# Patient Record
Sex: MALE | Race: WHITE | NOT HISPANIC OR LATINO | Employment: OTHER | RURAL
[De-identification: names, ages, dates, MRNs, and addresses within clinical notes are randomized per-mention and may not be internally consistent; named-entity substitution may affect disease eponyms.]

---

## 2020-08-12 ENCOUNTER — HISTORICAL (OUTPATIENT)
Dept: ADMINISTRATIVE | Facility: HOSPITAL | Age: 80
End: 2020-08-12

## 2020-08-13 LAB
APTT PPP: 44.5 SECONDS (ref 25.2–37.3)
INR BLD: 2.61 (ref 0–3.3)
PROTHROMBIN TIME: 26.4 SECONDS (ref 11.7–14.7)

## 2020-10-15 ENCOUNTER — HISTORICAL (OUTPATIENT)
Dept: ADMINISTRATIVE | Facility: HOSPITAL | Age: 80
End: 2020-10-15

## 2020-10-15 LAB
INR BLD: 2.66 (ref 0–3.3)
PROTHROMBIN TIME: 26.8 SECONDS (ref 11.7–14.7)

## 2021-05-24 RX ORDER — HYDROCHLOROTHIAZIDE 12.5 MG/1
12.5 TABLET ORAL DAILY
COMMUNITY
Start: 2021-04-22 | End: 2021-05-24 | Stop reason: SDUPTHER

## 2021-05-25 RX ORDER — HYDROCHLOROTHIAZIDE 12.5 MG/1
12.5 TABLET ORAL DAILY
Qty: 90 TABLET | Refills: 0 | Status: ON HOLD | OUTPATIENT
Start: 2021-05-25 | End: 2021-06-19 | Stop reason: HOSPADM

## 2021-06-01 ENCOUNTER — APPOINTMENT (OUTPATIENT)
Dept: LAB | Facility: CLINIC | Age: 81
End: 2021-06-01
Payer: MEDICARE

## 2021-06-01 DIAGNOSIS — I48.91 ATRIAL FIBRILLATION, UNSPECIFIED TYPE: Primary | ICD-10-CM

## 2021-06-01 LAB
CTP QC/QA: YES
INR POC: 2.3 (ref 0–3.3)
PT, POC: 28 (ref 12–14.7)

## 2021-06-01 PROCEDURE — 85610 PROTHROMBIN TIME: CPT | Mod: RHCUB

## 2021-06-17 ENCOUNTER — HOSPITAL ENCOUNTER (OUTPATIENT)
Facility: HOSPITAL | Age: 81
Discharge: HOME | End: 2021-06-19
Attending: EMERGENCY MEDICINE | Admitting: INTERNAL MEDICINE
Payer: MEDICARE

## 2021-06-17 DIAGNOSIS — I95.1 ORTHOSTATIC HYPOTENSION: Primary | ICD-10-CM

## 2021-06-17 DIAGNOSIS — R55 SYNCOPE: ICD-10-CM

## 2021-06-17 DIAGNOSIS — R53.1 GENERALIZED WEAKNESS: ICD-10-CM

## 2021-06-17 LAB
ALBUMIN SERPL BCP-MCNC: 4.1 G/DL (ref 3.5–5)
ALBUMIN/GLOB SERPL: 1.5 {RATIO}
ALP SERPL-CCNC: 74 U/L (ref 45–115)
ALT SERPL W P-5'-P-CCNC: 29 U/L (ref 16–61)
ANION GAP SERPL CALCULATED.3IONS-SCNC: 18 MMOL/L (ref 7–16)
ANISOCYTOSIS BLD QL SMEAR: ABNORMAL
APTT PPP: 31.3 SECONDS (ref 25.2–37.3)
AST SERPL W P-5'-P-CCNC: 21 U/L (ref 15–37)
BACTERIA #/AREA URNS HPF: ABNORMAL /HPF
BASOPHILS # BLD AUTO: 0.27 K/UL (ref 0–0.2)
BASOPHILS NFR BLD AUTO: 0.5 % (ref 0–1)
BASOPHILS NFR BLD MANUAL: 1 % (ref 0–1)
BILIRUB SERPL-MCNC: 0.4 MG/DL (ref 0–1.2)
BILIRUB UR QL STRIP: NEGATIVE
BUN SERPL-MCNC: 26 MG/DL (ref 7–18)
BUN/CREAT SERPL: 18 (ref 6–20)
CALCIUM SERPL-MCNC: 9.6 MG/DL (ref 8.5–10.1)
CHLORIDE SERPL-SCNC: 102 MMOL/L (ref 98–107)
CK MB SERPL-MCNC: <1 NG/ML (ref 1–3.6)
CK SERPL-CCNC: 77 U/L (ref 39–308)
CLARITY UR: CLEAR
CO2 SERPL-SCNC: 26 MMOL/L (ref 21–32)
COLOR UR: ABNORMAL
CREAT SERPL-MCNC: 1.44 MG/DL (ref 0.7–1.3)
DIFFERENTIAL METHOD BLD: ABNORMAL
EOSINOPHIL # BLD AUTO: 0.26 K/UL (ref 0–0.5)
EOSINOPHIL NFR BLD AUTO: 0.5 % (ref 1–4)
EOSINOPHIL NFR BLD MANUAL: 1 % (ref 1–4)
ERYTHROCYTE [DISTWIDTH] IN BLOOD BY AUTOMATED COUNT: 15 % (ref 11.5–14.5)
GLOBULIN SER-MCNC: 2.8 G/DL (ref 2–4)
GLUCOSE SERPL-MCNC: 132 MG/DL (ref 74–106)
GLUCOSE UR STRIP-MCNC: NEGATIVE MG/DL
HCT VFR BLD AUTO: 41.5 % (ref 40–54)
HGB BLD-MCNC: 13.3 G/DL (ref 13.5–18)
HYALINE CASTS #/AREA URNS LPF: ABNORMAL /LPF
IMM GRANULOCYTES # BLD AUTO: 0.24 K/UL (ref 0–0.04)
IMM GRANULOCYTES NFR BLD: 0.5 % (ref 0–0.4)
INR BLD: 1.96 (ref 0.9–1.1)
KETONES UR STRIP-SCNC: NEGATIVE MG/DL
LACTATE SERPL-SCNC: 1.5 MMOL/L (ref 0.4–2)
LEUKOCYTE ESTERASE UR QL STRIP: NEGATIVE
LYMPHOCYTES # BLD AUTO: 40.45 K/UL (ref 1–4.8)
LYMPHOCYTES NFR BLD AUTO: 76.8 % (ref 27–41)
LYMPHOCYTES NFR BLD MANUAL: 57 % (ref 27–41)
MAGNESIUM SERPL-MCNC: 1.9 MG/DL (ref 1.7–2.3)
MCH RBC QN AUTO: 29.2 PG (ref 27–31)
MCHC RBC AUTO-ENTMCNC: 32 G/DL (ref 32–36)
MCV RBC AUTO: 91 FL (ref 80–96)
MONOCYTES # BLD AUTO: 2.23 K/UL (ref 0–0.8)
MONOCYTES NFR BLD AUTO: 4.2 % (ref 2–6)
MONOCYTES NFR BLD MANUAL: 3 % (ref 2–6)
MPC BLD CALC-MCNC: 9.6 FL (ref 9.4–12.4)
NEUTROPHILS # BLD AUTO: 9.22 K/UL (ref 1.8–7.7)
NEUTROPHILS NFR BLD AUTO: 17.5 % (ref 53–65)
NEUTS SEG NFR BLD MANUAL: 38 % (ref 50–62)
NITRITE UR QL STRIP: NEGATIVE
NRBC # BLD AUTO: 0 X10E3/UL
NRBC, AUTO (.00): 0 %
NT-PROBNP SERPL-MCNC: 2624 PG/ML (ref 1–450)
OVALOCYTES BLD QL SMEAR: ABNORMAL
PH UR STRIP: 6.5 PH UNITS
PLATELET # BLD AUTO: 266 K/UL (ref 150–400)
PLATELET MORPHOLOGY: ABNORMAL
POLYCHROMASIA BLD QL SMEAR: ABNORMAL
POTASSIUM SERPL-SCNC: 4.3 MMOL/L (ref 3.5–5.1)
PROT SERPL-MCNC: 6.9 G/DL (ref 6.4–8.2)
PROT UR QL STRIP: NEGATIVE
PROTHROMBIN TIME: 21.2 SECONDS (ref 11.7–14.7)
RBC # BLD AUTO: 4.56 M/UL (ref 4.6–6.2)
RBC # UR STRIP: ABNORMAL /UL
RBC #/AREA URNS HPF: ABNORMAL /HPF
SMUDGE CELLS BLD QL SMEAR: ABNORMAL
SODIUM SERPL-SCNC: 142 MMOL/L (ref 136–145)
SP GR UR STRIP: 1.01
TROPONIN I SERPL-MCNC: 0.02 NG/ML
UROBILINOGEN UR STRIP-ACNC: 0.2 MG/DL
WBC # BLD AUTO: 52.67 K/UL (ref 4.5–11)
WBC #/AREA URNS HPF: ABNORMAL /HPF

## 2021-06-17 PROCEDURE — 85610 PROTHROMBIN TIME: CPT | Performed by: EMERGENCY MEDICINE

## 2021-06-17 PROCEDURE — 99283 PR EMERGENCY DEPT VISIT,LEVEL III: ICD-10-PCS | Mod: ,,, | Performed by: EMERGENCY MEDICINE

## 2021-06-17 PROCEDURE — 25000003 PHARM REV CODE 250: Performed by: EMERGENCY MEDICINE

## 2021-06-17 PROCEDURE — 96360 HYDRATION IV INFUSION INIT: CPT

## 2021-06-17 PROCEDURE — 96361 HYDRATE IV INFUSION ADD-ON: CPT

## 2021-06-17 PROCEDURE — 84484 ASSAY OF TROPONIN QUANT: CPT | Performed by: EMERGENCY MEDICINE

## 2021-06-17 PROCEDURE — 83605 ASSAY OF LACTIC ACID: CPT | Performed by: EMERGENCY MEDICINE

## 2021-06-17 PROCEDURE — 85730 THROMBOPLASTIN TIME PARTIAL: CPT | Performed by: EMERGENCY MEDICINE

## 2021-06-17 PROCEDURE — 25000003 PHARM REV CODE 250: Performed by: INTERNAL MEDICINE

## 2021-06-17 PROCEDURE — 83735 ASSAY OF MAGNESIUM: CPT | Performed by: EMERGENCY MEDICINE

## 2021-06-17 PROCEDURE — 85025 COMPLETE CBC W/AUTO DIFF WBC: CPT | Performed by: EMERGENCY MEDICINE

## 2021-06-17 PROCEDURE — 99219 PR INITIAL OBSERVATION CARE,LEVL II: ICD-10-PCS | Mod: ,,, | Performed by: INTERNAL MEDICINE

## 2021-06-17 PROCEDURE — 36415 COLL VENOUS BLD VENIPUNCTURE: CPT | Performed by: EMERGENCY MEDICINE

## 2021-06-17 PROCEDURE — 83880 ASSAY OF NATRIURETIC PEPTIDE: CPT | Performed by: EMERGENCY MEDICINE

## 2021-06-17 PROCEDURE — 93010 EKG 12-LEAD: ICD-10-PCS | Mod: ,,, | Performed by: INTERNAL MEDICINE

## 2021-06-17 PROCEDURE — 93005 ELECTROCARDIOGRAM TRACING: CPT

## 2021-06-17 PROCEDURE — 99219 PR INITIAL OBSERVATION CARE,LEVL II: CPT | Mod: ,,, | Performed by: INTERNAL MEDICINE

## 2021-06-17 PROCEDURE — 81001 URINALYSIS AUTO W/SCOPE: CPT | Performed by: EMERGENCY MEDICINE

## 2021-06-17 PROCEDURE — 93010 ELECTROCARDIOGRAM REPORT: CPT | Mod: ,,, | Performed by: INTERNAL MEDICINE

## 2021-06-17 PROCEDURE — 81003 URINALYSIS AUTO W/O SCOPE: CPT | Performed by: EMERGENCY MEDICINE

## 2021-06-17 PROCEDURE — G0378 HOSPITAL OBSERVATION PER HR: HCPCS

## 2021-06-17 PROCEDURE — 99285 EMERGENCY DEPT VISIT HI MDM: CPT | Mod: 25 | Performed by: EMERGENCY MEDICINE

## 2021-06-17 PROCEDURE — 96361 HYDRATE IV INFUSION ADD-ON: CPT | Performed by: EMERGENCY MEDICINE

## 2021-06-17 PROCEDURE — 82550 ASSAY OF CK (CPK): CPT | Performed by: EMERGENCY MEDICINE

## 2021-06-17 PROCEDURE — 99283 EMERGENCY DEPT VISIT LOW MDM: CPT | Mod: ,,, | Performed by: EMERGENCY MEDICINE

## 2021-06-17 PROCEDURE — 80053 COMPREHEN METABOLIC PANEL: CPT | Performed by: EMERGENCY MEDICINE

## 2021-06-17 RX ORDER — TALC
6 POWDER (GRAM) TOPICAL NIGHTLY PRN
Status: DISCONTINUED | OUTPATIENT
Start: 2021-06-17 | End: 2021-06-19 | Stop reason: HOSPADM

## 2021-06-17 RX ORDER — ACETAMINOPHEN 500 MG
1000 TABLET ORAL EVERY 8 HOURS PRN
Status: DISCONTINUED | OUTPATIENT
Start: 2021-06-17 | End: 2021-06-19 | Stop reason: HOSPADM

## 2021-06-17 RX ORDER — SODIUM CHLORIDE 9 MG/ML
INJECTION, SOLUTION INTRAVENOUS CONTINUOUS
Status: DISCONTINUED | OUTPATIENT
Start: 2021-06-17 | End: 2021-06-18

## 2021-06-17 RX ORDER — SODIUM,POTASSIUM PHOSPHATES 280-250MG
2 POWDER IN PACKET (EA) ORAL
Status: DISCONTINUED | OUTPATIENT
Start: 2021-06-17 | End: 2021-06-19 | Stop reason: HOSPADM

## 2021-06-17 RX ORDER — FINASTERIDE 5 MG/1
5 TABLET, FILM COATED ORAL NIGHTLY
COMMUNITY
Start: 2021-05-15 | End: 2024-03-05 | Stop reason: SDUPTHER

## 2021-06-17 RX ORDER — SODIUM CHLORIDE 0.9 % (FLUSH) 0.9 %
10 SYRINGE (ML) INJECTION
Status: DISCONTINUED | OUTPATIENT
Start: 2021-06-17 | End: 2021-06-19 | Stop reason: HOSPADM

## 2021-06-17 RX ORDER — METOPROLOL SUCCINATE 25 MG/1
25 TABLET, EXTENDED RELEASE ORAL DAILY
Status: DISCONTINUED | OUTPATIENT
Start: 2021-06-18 | End: 2021-06-19 | Stop reason: HOSPADM

## 2021-06-17 RX ORDER — LANOLIN ALCOHOL/MO/W.PET/CERES
800 CREAM (GRAM) TOPICAL
Status: DISCONTINUED | OUTPATIENT
Start: 2021-06-17 | End: 2021-06-19 | Stop reason: HOSPADM

## 2021-06-17 RX ORDER — WARFARIN SODIUM 5 MG/1
5 TABLET ORAL DAILY
COMMUNITY
Start: 2021-03-23 | End: 2021-06-25 | Stop reason: SDUPTHER

## 2021-06-17 RX ORDER — ATORVASTATIN CALCIUM 40 MG/1
40 TABLET, FILM COATED ORAL NIGHTLY
Status: DISCONTINUED | OUTPATIENT
Start: 2021-06-17 | End: 2021-06-19 | Stop reason: HOSPADM

## 2021-06-17 RX ORDER — CLOPIDOGREL BISULFATE 75 MG/1
75 TABLET ORAL DAILY
Status: DISCONTINUED | OUTPATIENT
Start: 2021-06-18 | End: 2021-06-19 | Stop reason: HOSPADM

## 2021-06-17 RX ORDER — METOPROLOL SUCCINATE 25 MG/1
25 TABLET, EXTENDED RELEASE ORAL 2 TIMES DAILY
COMMUNITY
Start: 2021-03-12

## 2021-06-17 RX ORDER — HYDROCHLOROTHIAZIDE 12.5 MG/1
12.5 TABLET ORAL DAILY
Status: DISCONTINUED | OUTPATIENT
Start: 2021-06-18 | End: 2021-06-18

## 2021-06-17 RX ORDER — ATORVASTATIN CALCIUM 40 MG/1
40 TABLET, FILM COATED ORAL NIGHTLY
COMMUNITY
Start: 2021-05-17 | End: 2022-02-25 | Stop reason: SDUPTHER

## 2021-06-17 RX ORDER — FINASTERIDE 5 MG/1
5 TABLET, FILM COATED ORAL DAILY
Status: DISCONTINUED | OUTPATIENT
Start: 2021-06-18 | End: 2021-06-19 | Stop reason: HOSPADM

## 2021-06-17 RX ORDER — WARFARIN SODIUM 5 MG/1
5 TABLET ORAL DAILY
Status: DISCONTINUED | OUTPATIENT
Start: 2021-06-18 | End: 2021-06-19 | Stop reason: HOSPADM

## 2021-06-17 RX ORDER — ONDANSETRON 2 MG/ML
4 INJECTION INTRAMUSCULAR; INTRAVENOUS EVERY 8 HOURS PRN
Status: DISCONTINUED | OUTPATIENT
Start: 2021-06-17 | End: 2021-06-19 | Stop reason: HOSPADM

## 2021-06-17 RX ORDER — IRBESARTAN 300 MG/1
150 TABLET ORAL DAILY
COMMUNITY
Start: 2021-03-12

## 2021-06-17 RX ORDER — GLUCAGON 1 MG
1 KIT INJECTION
Status: DISCONTINUED | OUTPATIENT
Start: 2021-06-17 | End: 2021-06-19 | Stop reason: HOSPADM

## 2021-06-17 RX ORDER — ACETAMINOPHEN 325 MG/1
650 TABLET ORAL EVERY 4 HOURS PRN
Status: DISCONTINUED | OUTPATIENT
Start: 2021-06-17 | End: 2021-06-19 | Stop reason: HOSPADM

## 2021-06-17 RX ORDER — PROCHLORPERAZINE EDISYLATE 5 MG/ML
5 INJECTION INTRAMUSCULAR; INTRAVENOUS EVERY 6 HOURS PRN
Status: DISCONTINUED | OUTPATIENT
Start: 2021-06-17 | End: 2021-06-19 | Stop reason: HOSPADM

## 2021-06-17 RX ORDER — POLYETHYLENE GLYCOL 3350 17 G/17G
17 POWDER, FOR SOLUTION ORAL DAILY
Status: DISCONTINUED | OUTPATIENT
Start: 2021-06-18 | End: 2021-06-19 | Stop reason: HOSPADM

## 2021-06-17 RX ORDER — CLOPIDOGREL BISULFATE 75 MG/1
75 TABLET ORAL DAILY
COMMUNITY
Start: 2021-04-21

## 2021-06-17 RX ADMIN — SODIUM CHLORIDE: 9 INJECTION, SOLUTION INTRAVENOUS at 07:06

## 2021-06-17 RX ADMIN — ATORVASTATIN CALCIUM 40 MG: 40 TABLET, FILM COATED ORAL at 09:06

## 2021-06-17 RX ADMIN — SODIUM CHLORIDE 1000 ML: 9 INJECTION, SOLUTION INTRAVENOUS at 04:06

## 2021-06-18 LAB
ANION GAP SERPL CALCULATED.3IONS-SCNC: 15 MMOL/L (ref 7–16)
ANISOCYTOSIS BLD QL SMEAR: ABNORMAL
AORTIC ROOT ANNULUS: 3.3 CM
AORTIC VALVE CUSP SEPERATION: 2.41 CM
AV INDEX (PROSTH): 1.15
AV MEAN GRADIENT: 2 MMHG
AV PEAK GRADIENT: 3 MMHG
AV REGURGITATION PRESSURE HALF TIME: 644 MS
AV VALVE AREA: 2.93 CM2
AV VELOCITY RATIO: 0.88
BASOPHILS # BLD AUTO: 0.2 K/UL (ref 0–0.2)
BASOPHILS NFR BLD AUTO: 0.4 % (ref 0–1)
BSA FOR ECHO PROCEDURE: 2.09 M2
BUN SERPL-MCNC: 23 MG/DL (ref 7–18)
BUN/CREAT SERPL: 20 (ref 6–20)
CALCIUM SERPL-MCNC: 8.7 MG/DL (ref 8.5–10.1)
CHLORIDE SERPL-SCNC: 107 MMOL/L (ref 98–107)
CO2 SERPL-SCNC: 26 MMOL/L (ref 21–32)
CREAT SERPL-MCNC: 1.16 MG/DL (ref 0.7–1.3)
CV ECHO LV RWT: 0.61 CM
DHEA SERPL-MCNC: NORMAL
DIFFERENTIAL METHOD BLD: ABNORMAL
DOP CALC AO PEAK VEL: 0.8 M/S
DOP CALC AO VTI: 13 CM
DOP CALC LVOT AREA: 2.5 CM2
DOP CALC LVOT DIAMETER: 1.8 CM
DOP CALC LVOT PEAK VEL: 0.7 M/S
DOP CALC LVOT STROKE VOLUME: 38.15 CM3
DOP CALCLVOT PEAK VEL VTI: 15 CM
E WAVE DECELERATION TIME: 146 MSEC
ECHO EF ESTIMATED: 60 %
ECHO LV POSTERIOR WALL: 1.24 CM (ref 0.6–1.1)
EJECTION FRACTION: 50 %
EOSINOPHIL # BLD AUTO: 0.24 K/UL (ref 0–0.5)
EOSINOPHIL NFR BLD AUTO: 0.5 % (ref 1–4)
EOSINOPHIL NFR BLD MANUAL: 2 % (ref 1–4)
ERYTHROCYTE [DISTWIDTH] IN BLOOD BY AUTOMATED COUNT: 15.3 % (ref 11.5–14.5)
ESTROGEN SERPL-MCNC: NORMAL PG/ML
FRACTIONAL SHORTENING: 29 % (ref 28–44)
GLUCOSE SERPL-MCNC: 127 MG/DL (ref 74–106)
HCT VFR BLD AUTO: 39 % (ref 40–54)
HGB BLD-MCNC: 12.6 G/DL (ref 13.5–18)
IMM GRANULOCYTES # BLD AUTO: 0.21 K/UL (ref 0–0.04)
IMM GRANULOCYTES NFR BLD: 0.4 % (ref 0–0.4)
INSULIN SERPL-ACNC: NORMAL U[IU]/ML
INTERVENTRICULAR SEPTUM: 1.38 CM (ref 0.6–1.1)
IVC OSTIUM: 1.7 CM
LAB AP CLINICAL INFORMATION: NORMAL
LEFT ATRIUM SIZE: 3.9 CM
LEFT INTERNAL DIMENSION IN SYSTOLE: 2.91 CM (ref 2.1–4)
LEFT VENTRICLE MASS INDEX: 94 G/M2
LEFT VENTRICULAR INTERNAL DIMENSION IN DIASTOLE: 4.07 CM (ref 3.5–6)
LEFT VENTRICULAR MASS: 193.62 G
LVOT MG: 1 MMHG
LYMPHOCYTES # BLD AUTO: 37.75 K/UL (ref 1–4.8)
LYMPHOCYTES NFR BLD AUTO: 76.6 % (ref 27–41)
LYMPHOCYTES NFR BLD MANUAL: 58 % (ref 27–41)
MAGNESIUM SERPL-MCNC: 1.8 MG/DL (ref 1.7–2.3)
MCH RBC QN AUTO: 29.3 PG (ref 27–31)
MCHC RBC AUTO-ENTMCNC: 32.3 G/DL (ref 32–36)
MCV RBC AUTO: 90.7 FL (ref 80–96)
MONOCYTES # BLD AUTO: 3.07 K/UL (ref 0–0.8)
MONOCYTES NFR BLD AUTO: 6.2 % (ref 2–6)
MONOCYTES NFR BLD MANUAL: 6 % (ref 2–6)
MPC BLD CALC-MCNC: 9.6 FL (ref 9.4–12.4)
MV PEAK E VEL: 0.85 M/S
NEUTROPHILS # BLD AUTO: 7.81 K/UL (ref 1.8–7.7)
NEUTROPHILS NFR BLD AUTO: 15.9 % (ref 53–65)
NEUTS SEG NFR BLD MANUAL: 34 % (ref 50–62)
NRBC # BLD AUTO: 0 X10E3/UL
NRBC, AUTO (.00): 0 %
OVALOCYTES BLD QL SMEAR: ABNORMAL
PHOSPHATE SERPL-MCNC: 3.2 MG/DL (ref 2.5–4.5)
PISA AR MAX VEL: 2.67 M/S
PISA TR MAX VEL: 3.2 M/S
PLATELET # BLD AUTO: 247 K/UL (ref 150–400)
PLATELET MORPHOLOGY: ABNORMAL
POLYCHROMASIA BLD QL SMEAR: ABNORMAL
POTASSIUM SERPL-SCNC: 4.1 MMOL/L (ref 3.5–5.1)
RA MAJOR: 3.7 CM
RA PRESSURE: 3 MMHG
RBC # BLD AUTO: 4.3 M/UL (ref 4.6–6.2)
RIGHT VENTRICULAR END-DIASTOLIC DIMENSION: 3.9 CM
SMUDGE CELLS BLD QL SMEAR: ABNORMAL
SODIUM SERPL-SCNC: 144 MMOL/L (ref 136–145)
T3RU NFR SERPL: NORMAL %
TR MAX PG: 41 MMHG
TRICUSPID ANNULAR PLANE SYSTOLIC EXCURSION: 2.2 CM
TV REST PULMONARY ARTERY PRESSURE: 44 MMHG
WBC # BLD AUTO: 49.28 K/UL (ref 4.5–11)

## 2021-06-18 PROCEDURE — 84100 ASSAY OF PHOSPHORUS: CPT | Performed by: INTERNAL MEDICINE

## 2021-06-18 PROCEDURE — 80048 BASIC METABOLIC PNL TOTAL CA: CPT | Performed by: INTERNAL MEDICINE

## 2021-06-18 PROCEDURE — 36415 COLL VENOUS BLD VENIPUNCTURE: CPT | Performed by: INTERNAL MEDICINE

## 2021-06-18 PROCEDURE — 99225 PR SUBSEQUENT OBSERVATION CARE,LEVEL II: ICD-10-PCS | Mod: ,,, | Performed by: INTERNAL MEDICINE

## 2021-06-18 PROCEDURE — 85025 COMPLETE CBC W/AUTO DIFF WBC: CPT | Performed by: INTERNAL MEDICINE

## 2021-06-18 PROCEDURE — 99225 PR SUBSEQUENT OBSERVATION CARE,LEVEL II: CPT | Mod: ,,, | Performed by: INTERNAL MEDICINE

## 2021-06-18 PROCEDURE — 25000003 PHARM REV CODE 250: Performed by: INTERNAL MEDICINE

## 2021-06-18 PROCEDURE — 87040 BLOOD CULTURE FOR BACTERIA: CPT | Performed by: INTERNAL MEDICINE

## 2021-06-18 PROCEDURE — G0378 HOSPITAL OBSERVATION PER HR: HCPCS

## 2021-06-18 PROCEDURE — 97165 OT EVAL LOW COMPLEX 30 MIN: CPT

## 2021-06-18 PROCEDURE — 96361 HYDRATE IV INFUSION ADD-ON: CPT | Performed by: EMERGENCY MEDICINE

## 2021-06-18 PROCEDURE — 83735 ASSAY OF MAGNESIUM: CPT | Performed by: INTERNAL MEDICINE

## 2021-06-18 PROCEDURE — 85060 PERIPHERAL SMEAR FOR PATHOLOGIST REVIEW: ICD-10-PCS | Mod: ,,, | Performed by: PATHOLOGY

## 2021-06-18 PROCEDURE — 25000003 PHARM REV CODE 250: Performed by: HOSPITALIST

## 2021-06-18 PROCEDURE — 85060 BLOOD SMEAR INTERPRETATION: CPT | Mod: ,,, | Performed by: PATHOLOGY

## 2021-06-18 PROCEDURE — 97161 PT EVAL LOW COMPLEX 20 MIN: CPT

## 2021-06-18 PROCEDURE — 96361 HYDRATE IV INFUSION ADD-ON: CPT

## 2021-06-18 RX ORDER — SODIUM CHLORIDE, SODIUM GLUCONATE, SODIUM ACETATE, POTASSIUM CHLORIDE AND MAGNESIUM CHLORIDE 30; 37; 368; 526; 502 MG/100ML; MG/100ML; MG/100ML; MG/100ML; MG/100ML
1000 INJECTION, SOLUTION INTRAVENOUS CONTINUOUS
Status: DISCONTINUED | OUTPATIENT
Start: 2021-06-18 | End: 2021-06-18

## 2021-06-18 RX ORDER — TAMSULOSIN HYDROCHLORIDE 0.4 MG/1
CAPSULE ORAL DAILY
COMMUNITY
End: 2023-11-02 | Stop reason: SDUPTHER

## 2021-06-18 RX ORDER — BISACODYL 5 MG
5 TABLET, DELAYED RELEASE (ENTERIC COATED) ORAL DAILY PRN
Status: DISCONTINUED | OUTPATIENT
Start: 2021-06-18 | End: 2021-06-19 | Stop reason: HOSPADM

## 2021-06-18 RX ORDER — AMLODIPINE BESYLATE 5 MG/1
5 TABLET ORAL DAILY
Status: DISCONTINUED | OUTPATIENT
Start: 2021-06-18 | End: 2021-06-19 | Stop reason: HOSPADM

## 2021-06-18 RX ORDER — POLYETHYLENE GLYCOL 3350 17 G/17G
17 POWDER, FOR SOLUTION ORAL ONCE
Status: COMPLETED | OUTPATIENT
Start: 2021-06-18 | End: 2021-06-18

## 2021-06-18 RX ADMIN — HYDROCHLOROTHIAZIDE 12.5 MG: 12.5 TABLET ORAL at 10:06

## 2021-06-18 RX ADMIN — CLOPIDOGREL 75 MG: 75 TABLET, FILM COATED ORAL at 09:06

## 2021-06-18 RX ADMIN — ATORVASTATIN CALCIUM 40 MG: 40 TABLET, FILM COATED ORAL at 08:06

## 2021-06-18 RX ADMIN — POLYETHYLENE GLYCOL 3350 17 G: 17 POWDER, FOR SOLUTION ORAL at 09:06

## 2021-06-18 RX ADMIN — SODIUM CHLORIDE, SODIUM GLUCONATE, SODIUM ACETATE, POTASSIUM CHLORIDE AND MAGNESIUM CHLORIDE 1000 ML: 526; 502; 368; 37; 30 INJECTION, SOLUTION INTRAVENOUS at 09:06

## 2021-06-18 RX ADMIN — POLYETHYLENE GLYCOL 3350 17 G: 17 POWDER, FOR SOLUTION ORAL at 10:06

## 2021-06-18 RX ADMIN — WARFARIN SODIUM 5 MG: 5 TABLET ORAL at 05:06

## 2021-06-18 RX ADMIN — METOPROLOL SUCCINATE 25 MG: 25 TABLET, EXTENDED RELEASE ORAL at 05:06

## 2021-06-18 RX ADMIN — FINASTERIDE 5 MG: 5 TABLET, FILM COATED ORAL at 08:06

## 2021-06-19 VITALS
BODY MASS INDEX: 27.92 KG/M2 | TEMPERATURE: 98 F | HEIGHT: 70 IN | WEIGHT: 195 LBS | SYSTOLIC BLOOD PRESSURE: 160 MMHG | OXYGEN SATURATION: 98 % | DIASTOLIC BLOOD PRESSURE: 79 MMHG | RESPIRATION RATE: 18 BRPM | HEART RATE: 60 BPM

## 2021-06-19 LAB
ANION GAP SERPL CALCULATED.3IONS-SCNC: 14 MMOL/L (ref 7–16)
ANISOCYTOSIS BLD QL SMEAR: ABNORMAL
ATYPICAL LYMPHOCYTES: ABNORMAL
BASOPHILS # BLD AUTO: 0.19 K/UL (ref 0–0.2)
BASOPHILS NFR BLD AUTO: 0.4 % (ref 0–1)
BUN SERPL-MCNC: 20 MG/DL (ref 7–18)
BUN/CREAT SERPL: 18 (ref 6–20)
CALCIUM SERPL-MCNC: 8.7 MG/DL (ref 8.5–10.1)
CHLORIDE SERPL-SCNC: 105 MMOL/L (ref 98–107)
CO2 SERPL-SCNC: 28 MMOL/L (ref 21–32)
CREAT SERPL-MCNC: 1.13 MG/DL (ref 0.7–1.3)
DIFFERENTIAL METHOD BLD: ABNORMAL
EOSINOPHIL # BLD AUTO: 0.39 K/UL (ref 0–0.5)
EOSINOPHIL NFR BLD AUTO: 0.9 % (ref 1–4)
EOSINOPHIL NFR BLD MANUAL: 2 % (ref 1–4)
ERYTHROCYTE [DISTWIDTH] IN BLOOD BY AUTOMATED COUNT: 15.2 % (ref 11.5–14.5)
GLUCOSE SERPL-MCNC: 127 MG/DL (ref 74–106)
HCT VFR BLD AUTO: 38.8 % (ref 40–54)
HGB BLD-MCNC: 12.1 G/DL (ref 13.5–18)
IMM GRANULOCYTES # BLD AUTO: 0.13 K/UL (ref 0–0.04)
IMM GRANULOCYTES NFR BLD: 0.3 % (ref 0–0.4)
LYMPHOCYTES # BLD AUTO: 36.46 K/UL (ref 1–4.8)
LYMPHOCYTES NFR BLD AUTO: 79.6 % (ref 27–41)
LYMPHOCYTES NFR BLD MANUAL: 76 % (ref 27–41)
MAGNESIUM SERPL-MCNC: 2 MG/DL (ref 1.7–2.3)
MCH RBC QN AUTO: 29.3 PG (ref 27–31)
MCHC RBC AUTO-ENTMCNC: 31.2 G/DL (ref 32–36)
MCV RBC AUTO: 93.9 FL (ref 80–96)
MONOCYTES # BLD AUTO: 2.53 K/UL (ref 0–0.8)
MONOCYTES NFR BLD AUTO: 5.5 % (ref 2–6)
MONOCYTES NFR BLD MANUAL: 2 % (ref 2–6)
MPC BLD CALC-MCNC: 9.9 FL (ref 9.4–12.4)
NEUTROPHILS # BLD AUTO: 6.11 K/UL (ref 1.8–7.7)
NEUTROPHILS NFR BLD AUTO: 13.3 % (ref 53–65)
NEUTS BAND NFR BLD MANUAL: 2 % (ref 1–5)
NEUTS SEG NFR BLD MANUAL: 18 % (ref 50–62)
NRBC # BLD AUTO: 0 X10E3/UL
NRBC, AUTO (.00): 0 %
OVALOCYTES BLD QL SMEAR: ABNORMAL
PHOSPHATE SERPL-MCNC: 2.8 MG/DL (ref 2.5–4.5)
PLATELET # BLD AUTO: 241 K/UL (ref 150–400)
POLYCHROMASIA BLD QL SMEAR: ABNORMAL
POTASSIUM SERPL-SCNC: 4.1 MMOL/L (ref 3.5–5.1)
RBC # BLD AUTO: 4.13 M/UL (ref 4.6–6.2)
SMUDGE CELLS BLD QL SMEAR: ABNORMAL
SODIUM SERPL-SCNC: 143 MMOL/L (ref 136–145)
WBC # BLD AUTO: 45.81 K/UL (ref 4.5–11)

## 2021-06-19 PROCEDURE — 99217 PR OBSERVATION CARE DISCHARGE: CPT | Mod: ,,, | Performed by: INTERNAL MEDICINE

## 2021-06-19 PROCEDURE — 85025 COMPLETE CBC W/AUTO DIFF WBC: CPT | Performed by: INTERNAL MEDICINE

## 2021-06-19 PROCEDURE — 83735 ASSAY OF MAGNESIUM: CPT | Performed by: INTERNAL MEDICINE

## 2021-06-19 PROCEDURE — 99217 PR OBSERVATION CARE DISCHARGE: ICD-10-PCS | Mod: ,,, | Performed by: INTERNAL MEDICINE

## 2021-06-19 PROCEDURE — 25000003 PHARM REV CODE 250: Performed by: INTERNAL MEDICINE

## 2021-06-19 PROCEDURE — 36415 COLL VENOUS BLD VENIPUNCTURE: CPT | Performed by: INTERNAL MEDICINE

## 2021-06-19 PROCEDURE — 80048 BASIC METABOLIC PNL TOTAL CA: CPT | Performed by: INTERNAL MEDICINE

## 2021-06-19 PROCEDURE — G0378 HOSPITAL OBSERVATION PER HR: HCPCS

## 2021-06-19 PROCEDURE — 84100 ASSAY OF PHOSPHORUS: CPT | Performed by: INTERNAL MEDICINE

## 2021-06-19 RX ORDER — TAMSULOSIN HYDROCHLORIDE 0.4 MG/1
0.4 CAPSULE ORAL DAILY
Status: DISCONTINUED | OUTPATIENT
Start: 2021-06-19 | End: 2021-06-19 | Stop reason: HOSPADM

## 2021-06-19 RX ORDER — HYDRALAZINE HYDROCHLORIDE 10 MG/1
10 TABLET, FILM COATED ORAL
Qty: 30 TABLET | Refills: 1 | Status: SHIPPED | OUTPATIENT
Start: 2021-06-19 | End: 2021-06-25 | Stop reason: SDUPTHER

## 2021-06-19 RX ADMIN — AMLODIPINE BESYLATE 5 MG: 5 TABLET ORAL at 09:06

## 2021-06-19 RX ADMIN — CLOPIDOGREL 75 MG: 75 TABLET, FILM COATED ORAL at 09:06

## 2021-06-19 RX ADMIN — TAMSULOSIN HYDROCHLORIDE 0.4 MG: 0.4 CAPSULE ORAL at 09:06

## 2021-06-19 RX ADMIN — POLYETHYLENE GLYCOL 3350 17 G: 17 POWDER, FOR SOLUTION ORAL at 09:06

## 2021-06-21 ENCOUNTER — TELEPHONE (OUTPATIENT)
Dept: FAMILY MEDICINE | Facility: CLINIC | Age: 81
End: 2021-06-21

## 2021-06-21 RX ORDER — ASCORBIC ACID 500 MG
1000 TABLET ORAL DAILY
COMMUNITY
Start: 2020-06-16 | End: 2021-10-18 | Stop reason: ALTCHOICE

## 2021-06-21 RX ORDER — PANTOPRAZOLE SODIUM 40 MG/1
1 TABLET, DELAYED RELEASE ORAL EVERY MORNING
COMMUNITY
Start: 2021-03-23 | End: 2021-06-28 | Stop reason: SDUPTHER

## 2021-06-24 LAB
BACTERIA BLD CULT: NORMAL
BACTERIA BLD CULT: NORMAL

## 2021-06-25 ENCOUNTER — OFFICE VISIT (OUTPATIENT)
Dept: FAMILY MEDICINE | Facility: CLINIC | Age: 81
End: 2021-06-25
Payer: MEDICARE

## 2021-06-25 VITALS
TEMPERATURE: 98 F | WEIGHT: 205.38 LBS | OXYGEN SATURATION: 98 % | SYSTOLIC BLOOD PRESSURE: 153 MMHG | BODY MASS INDEX: 29.4 KG/M2 | RESPIRATION RATE: 16 BRPM | DIASTOLIC BLOOD PRESSURE: 78 MMHG | HEART RATE: 70 BPM | HEIGHT: 70 IN

## 2021-06-25 DIAGNOSIS — R55 SYNCOPE AND COLLAPSE: ICD-10-CM

## 2021-06-25 DIAGNOSIS — I10 HYPERTENSION, UNSPECIFIED TYPE: Primary | ICD-10-CM

## 2021-06-25 DIAGNOSIS — E86.0 DEHYDRATION: ICD-10-CM

## 2021-06-25 PROBLEM — R53.1 GENERALIZED WEAKNESS: Chronic | Status: ACTIVE | Noted: 2021-06-17

## 2021-06-25 PROCEDURE — 99495 TCM SERVICES (MODERATE COMPLEXITY): ICD-10-PCS | Mod: ,,, | Performed by: FAMILY MEDICINE

## 2021-06-25 PROCEDURE — 99495 TRANSJ CARE MGMT MOD F2F 14D: CPT | Mod: ,,, | Performed by: FAMILY MEDICINE

## 2021-06-25 RX ORDER — HYDRALAZINE HYDROCHLORIDE 10 MG/1
10 TABLET, FILM COATED ORAL EVERY 8 HOURS
Qty: 90 TABLET | Refills: 2 | Status: SHIPPED | OUTPATIENT
Start: 2021-06-25 | End: 2021-09-29 | Stop reason: SDUPTHER

## 2021-06-25 RX ORDER — WARFARIN SODIUM 5 MG/1
5 TABLET ORAL DAILY
Qty: 30 TABLET | Refills: 2 | Status: SHIPPED | OUTPATIENT
Start: 2021-06-25 | End: 2021-09-29 | Stop reason: SDUPTHER

## 2021-06-28 DIAGNOSIS — K21.9 GASTROESOPHAGEAL REFLUX DISEASE, UNSPECIFIED WHETHER ESOPHAGITIS PRESENT: Primary | ICD-10-CM

## 2021-06-28 RX ORDER — PANTOPRAZOLE SODIUM 40 MG/1
40 TABLET, DELAYED RELEASE ORAL EVERY MORNING
Qty: 90 TABLET | Refills: 1 | Status: SHIPPED | OUTPATIENT
Start: 2021-06-28 | End: 2021-09-29 | Stop reason: SDUPTHER

## 2021-06-29 ENCOUNTER — OFFICE VISIT (OUTPATIENT)
Dept: SURGERY | Facility: CLINIC | Age: 81
End: 2021-06-29
Payer: MEDICARE

## 2021-06-29 VITALS — HEIGHT: 70 IN | HEART RATE: 76 BPM | BODY MASS INDEX: 27.92 KG/M2 | WEIGHT: 195 LBS | OXYGEN SATURATION: 98 %

## 2021-06-29 DIAGNOSIS — I65.23 BILATERAL CAROTID ARTERY STENOSIS: Primary | ICD-10-CM

## 2021-06-29 DIAGNOSIS — I65.29 OCCLUSION AND STENOSIS OF UNSPECIFIED CAROTID ARTERY: ICD-10-CM

## 2021-06-29 PROCEDURE — 99203 OFFICE O/P NEW LOW 30 MIN: CPT | Mod: S$PBB,,, | Performed by: SURGERY

## 2021-06-29 PROCEDURE — 99203 PR OFFICE/OUTPT VISIT, NEW, LEVL III, 30-44 MIN: ICD-10-PCS | Mod: S$PBB,,, | Performed by: SURGERY

## 2021-06-29 PROCEDURE — 99214 OFFICE O/P EST MOD 30 MIN: CPT | Mod: PBBFAC | Performed by: SURGERY

## 2021-07-09 ENCOUNTER — HOSPITAL ENCOUNTER (OUTPATIENT)
Dept: RADIOLOGY | Facility: HOSPITAL | Age: 81
Discharge: HOME | End: 2021-07-09
Attending: SURGERY
Payer: MEDICARE

## 2021-07-09 DIAGNOSIS — I65.29 OCCLUSION AND STENOSIS OF UNSPECIFIED CAROTID ARTERY: ICD-10-CM

## 2021-07-09 PROCEDURE — 70498 CTA NECK: ICD-10-PCS | Mod: 26,,, | Performed by: RADIOLOGY

## 2021-07-09 PROCEDURE — 70498 CT ANGIOGRAPHY NECK: CPT | Mod: TC

## 2021-07-09 PROCEDURE — 70498 CT ANGIOGRAPHY NECK: CPT | Mod: 26,,, | Performed by: RADIOLOGY

## 2021-07-09 PROCEDURE — 25500020 PHARM REV CODE 255: Performed by: SURGERY

## 2021-07-09 RX ADMIN — IOPAMIDOL 100 ML: 755 INJECTION, SOLUTION INTRAVENOUS at 09:07

## 2021-07-13 ENCOUNTER — OFFICE VISIT (OUTPATIENT)
Dept: SURGERY | Facility: CLINIC | Age: 81
End: 2021-07-13
Payer: MEDICARE

## 2021-07-13 VITALS — HEART RATE: 51 BPM | OXYGEN SATURATION: 97 % | BODY MASS INDEX: 27.92 KG/M2 | WEIGHT: 195 LBS | HEIGHT: 70 IN

## 2021-07-13 DIAGNOSIS — I65.23 BILATERAL CAROTID ARTERY STENOSIS: Primary | ICD-10-CM

## 2021-07-13 PROCEDURE — 99212 OFFICE O/P EST SF 10 MIN: CPT | Mod: S$PBB,ICN,, | Performed by: SURGERY

## 2021-07-13 PROCEDURE — 99212 PR OFFICE/OUTPT VISIT, EST, LEVL II, 10-19 MIN: ICD-10-PCS | Mod: S$PBB,ICN,, | Performed by: SURGERY

## 2021-07-13 PROCEDURE — 99214 OFFICE O/P EST MOD 30 MIN: CPT | Mod: PBBFAC | Performed by: SURGERY

## 2021-08-09 ENCOUNTER — APPOINTMENT (OUTPATIENT)
Dept: LAB | Facility: CLINIC | Age: 81
End: 2021-08-09
Payer: MEDICARE

## 2021-08-09 DIAGNOSIS — Z79.2 LONG TERM (CURRENT) USE OF ANTIBIOTICS: Primary | ICD-10-CM

## 2021-08-09 DIAGNOSIS — I48.91 ATRIAL FIBRILLATION, UNSPECIFIED TYPE: ICD-10-CM

## 2021-08-09 LAB
CTP QC/QA: YES
INR POC: 2 (ref 0–3.3)
PT, POC: 24 (ref 12–14.7)

## 2021-08-09 PROCEDURE — 85610 PROTHROMBIN TIME: CPT | Mod: RHCUB

## 2021-08-24 PROBLEM — G47.33 OSA ON CPAP: Status: ACTIVE | Noted: 2021-08-24

## 2021-08-25 ENCOUNTER — OFFICE VISIT (OUTPATIENT)
Dept: SLEEP MEDICINE | Facility: CLINIC | Age: 81
End: 2021-08-25
Payer: MEDICARE

## 2021-08-25 VITALS
HEART RATE: 111 BPM | OXYGEN SATURATION: 99 % | BODY MASS INDEX: 29.8 KG/M2 | SYSTOLIC BLOOD PRESSURE: 146 MMHG | WEIGHT: 208.19 LBS | DIASTOLIC BLOOD PRESSURE: 88 MMHG | HEIGHT: 70 IN

## 2021-08-25 DIAGNOSIS — I25.2 HISTORY OF MYOCARDIAL INFARCTION: ICD-10-CM

## 2021-08-25 DIAGNOSIS — I48.91 ATRIAL FIBRILLATION, UNSPECIFIED TYPE: ICD-10-CM

## 2021-08-25 DIAGNOSIS — G47.33 OSA ON CPAP: Primary | ICD-10-CM

## 2021-08-25 PROCEDURE — 99213 OFFICE O/P EST LOW 20 MIN: CPT | Mod: S$PBB,PN | Performed by: FAMILY MEDICINE

## 2021-08-25 PROCEDURE — 99999 PR STA SHADOW: CPT | Mod: PBBFAC,,, | Performed by: FAMILY MEDICINE

## 2021-08-25 PROCEDURE — 99999 PR STA SHADOW: ICD-10-PCS | Mod: PBBFAC,,, | Performed by: FAMILY MEDICINE

## 2021-08-25 PROCEDURE — 99213 OFFICE O/P EST LOW 20 MIN: CPT | Mod: PBBFAC,PN | Performed by: FAMILY MEDICINE

## 2021-09-29 ENCOUNTER — OFFICE VISIT (OUTPATIENT)
Dept: FAMILY MEDICINE | Facility: CLINIC | Age: 81
End: 2021-09-29
Payer: MEDICARE

## 2021-09-29 VITALS
HEIGHT: 70 IN | OXYGEN SATURATION: 93 % | DIASTOLIC BLOOD PRESSURE: 83 MMHG | BODY MASS INDEX: 29.49 KG/M2 | HEART RATE: 77 BPM | WEIGHT: 206 LBS | RESPIRATION RATE: 18 BRPM | TEMPERATURE: 97 F | SYSTOLIC BLOOD PRESSURE: 178 MMHG

## 2021-09-29 DIAGNOSIS — Z79.01 LONG TERM CURRENT USE OF ANTICOAGULANT: ICD-10-CM

## 2021-09-29 DIAGNOSIS — I10 ESSENTIAL HYPERTENSION: Chronic | ICD-10-CM

## 2021-09-29 DIAGNOSIS — K21.9 GASTROESOPHAGEAL REFLUX DISEASE WITHOUT ESOPHAGITIS: Chronic | ICD-10-CM

## 2021-09-29 DIAGNOSIS — E78.2 MIXED HYPERLIPIDEMIA: Chronic | ICD-10-CM

## 2021-09-29 DIAGNOSIS — G47.33 OSA ON CPAP: Chronic | ICD-10-CM

## 2021-09-29 DIAGNOSIS — I48.20 CHRONIC ATRIAL FIBRILLATION: Primary | Chronic | ICD-10-CM

## 2021-09-29 PROBLEM — I25.2 HISTORY OF MYOCARDIAL INFARCTION: Chronic | Status: ACTIVE | Noted: 2021-08-25

## 2021-09-29 PROBLEM — I48.91 ATRIAL FIBRILLATION: Chronic | Status: ACTIVE | Noted: 2021-08-25

## 2021-09-29 LAB
CTP QC/QA: YES
INR POC: 1.9 (ref 0–3.3)
PT, POC: 22.9 (ref 12–14.7)

## 2021-09-29 PROCEDURE — 99214 PR OFFICE/OUTPT VISIT, EST, LEVL IV, 30-39 MIN: ICD-10-PCS | Mod: ,,, | Performed by: FAMILY MEDICINE

## 2021-09-29 PROCEDURE — 85610 PROTHROMBIN TIME: CPT | Mod: RHCUB | Performed by: FAMILY MEDICINE

## 2021-09-29 PROCEDURE — 99214 OFFICE O/P EST MOD 30 MIN: CPT | Mod: ,,, | Performed by: FAMILY MEDICINE

## 2021-09-29 RX ORDER — IRBESARTAN 150 MG/1
150 TABLET ORAL NIGHTLY
COMMUNITY
End: 2021-09-29 | Stop reason: DRUGHIGH

## 2021-09-29 RX ORDER — AMLODIPINE BESYLATE 2.5 MG/1
2.5 TABLET ORAL DAILY
COMMUNITY
Start: 2021-04-16 | End: 2021-10-18

## 2021-09-29 RX ORDER — HYDROCHLOROTHIAZIDE 12.5 MG/1
12.5 CAPSULE ORAL DAILY
COMMUNITY
End: 2021-09-29 | Stop reason: SDUPTHER

## 2021-09-29 RX ORDER — AMIODARONE HYDROCHLORIDE 200 MG/1
200 TABLET ORAL
COMMUNITY
Start: 2020-06-16 | End: 2021-10-18

## 2021-09-29 RX ORDER — PANTOPRAZOLE SODIUM 40 MG/1
40 TABLET, DELAYED RELEASE ORAL EVERY MORNING
Qty: 90 TABLET | Refills: 1 | Status: SHIPPED | OUTPATIENT
Start: 2021-09-29 | End: 2022-03-31 | Stop reason: SDUPTHER

## 2021-09-29 RX ORDER — HYDROCODONE BITARTRATE AND ACETAMINOPHEN 5; 325 MG/1; MG/1
TABLET ORAL
COMMUNITY
Start: 2020-06-16 | End: 2021-10-18 | Stop reason: ALTCHOICE

## 2021-09-29 RX ORDER — ASPIRIN 81 MG/1
81 TABLET ORAL
COMMUNITY
Start: 2020-06-16

## 2021-09-29 RX ORDER — HYDROCHLOROTHIAZIDE 12.5 MG/1
12.5 CAPSULE ORAL DAILY
Qty: 90 CAPSULE | Refills: 1 | Status: SHIPPED | OUTPATIENT
Start: 2021-09-29 | End: 2021-11-17

## 2021-09-29 RX ORDER — WARFARIN SODIUM 5 MG/1
5 TABLET ORAL DAILY
Qty: 90 TABLET | Refills: 1 | Status: SHIPPED | OUTPATIENT
Start: 2021-09-29 | End: 2021-11-17

## 2021-09-29 RX ORDER — HYDRALAZINE HYDROCHLORIDE 10 MG/1
10 TABLET, FILM COATED ORAL EVERY 8 HOURS
Qty: 270 TABLET | Refills: 1 | Status: SHIPPED | OUTPATIENT
Start: 2021-09-29 | End: 2021-10-18 | Stop reason: DRUGHIGH

## 2021-10-18 ENCOUNTER — OFFICE VISIT (OUTPATIENT)
Dept: FAMILY MEDICINE | Facility: CLINIC | Age: 81
End: 2021-10-18
Payer: MEDICARE

## 2021-10-18 VITALS
HEART RATE: 93 BPM | RESPIRATION RATE: 18 BRPM | SYSTOLIC BLOOD PRESSURE: 171 MMHG | TEMPERATURE: 97 F | WEIGHT: 204 LBS | DIASTOLIC BLOOD PRESSURE: 100 MMHG | HEIGHT: 70 IN | BODY MASS INDEX: 29.2 KG/M2 | OXYGEN SATURATION: 97 %

## 2021-10-18 DIAGNOSIS — I48.20 CHRONIC ATRIAL FIBRILLATION: Primary | Chronic | ICD-10-CM

## 2021-10-18 DIAGNOSIS — Z79.01 LONG TERM CURRENT USE OF ANTICOAGULANT: ICD-10-CM

## 2021-10-18 DIAGNOSIS — I10 PRIMARY HYPERTENSION: Chronic | ICD-10-CM

## 2021-10-18 LAB
ANION GAP SERPL CALCULATED.3IONS-SCNC: 11 MMOL/L (ref 7–16)
ATYPICAL LYMPHOCYTES: ABNORMAL
BASOPHILS # BLD AUTO: 0.26 K/UL (ref 0–0.2)
BASOPHILS NFR BLD AUTO: 0.5 % (ref 0–1)
BUN SERPL-MCNC: 18 MG/DL (ref 7–18)
BUN/CREAT SERPL: 15 (ref 6–20)
CALCIUM SERPL-MCNC: 9.8 MG/DL (ref 8.5–10.1)
CHLORIDE SERPL-SCNC: 103 MMOL/L (ref 98–107)
CO2 SERPL-SCNC: 29 MMOL/L (ref 21–32)
CREAT SERPL-MCNC: 1.18 MG/DL (ref 0.7–1.3)
DIFFERENTIAL METHOD BLD: ABNORMAL
EOSINOPHIL # BLD AUTO: 0.34 K/UL (ref 0–0.5)
EOSINOPHIL NFR BLD AUTO: 0.6 % (ref 1–4)
EOSINOPHIL NFR BLD MANUAL: 1 % (ref 1–4)
ERYTHROCYTE [DISTWIDTH] IN BLOOD BY AUTOMATED COUNT: 14.8 % (ref 11.5–14.5)
GLUCOSE SERPL-MCNC: 106 MG/DL (ref 74–106)
HCT VFR BLD AUTO: 41.5 % (ref 40–54)
HGB BLD-MCNC: 13.6 G/DL (ref 13.5–18)
IMM GRANULOCYTES # BLD AUTO: 0.15 K/UL (ref 0–0.04)
IMM GRANULOCYTES NFR BLD: 0.3 % (ref 0–0.4)
LYMPHOCYTES # BLD AUTO: 45.4 K/UL (ref 1–4.8)
LYMPHOCYTES NFR BLD AUTO: 81.7 % (ref 27–41)
LYMPHOCYTES NFR BLD MANUAL: 80 % (ref 27–41)
MCH RBC QN AUTO: 28.6 PG (ref 27–31)
MCHC RBC AUTO-ENTMCNC: 32.8 G/DL (ref 32–36)
MCV RBC AUTO: 87.4 FL (ref 80–96)
MONOCYTES # BLD AUTO: 2.29 K/UL (ref 0–0.8)
MONOCYTES NFR BLD AUTO: 4.1 % (ref 2–6)
MONOCYTES NFR BLD MANUAL: 2 % (ref 2–6)
MPC BLD CALC-MCNC: 9.7 FL (ref 9.4–12.4)
NEUTROPHILS # BLD AUTO: 7.1 K/UL (ref 1.8–7.7)
NEUTROPHILS NFR BLD AUTO: 12.8 % (ref 53–65)
NEUTS SEG NFR BLD MANUAL: 17 % (ref 50–62)
NRBC # BLD AUTO: 0 X10E3/UL
NRBC, AUTO (.00): 0 %
PLATELET # BLD AUTO: 280 K/UL (ref 150–400)
PLATELET MORPHOLOGY: NORMAL
POTASSIUM SERPL-SCNC: 4 MMOL/L (ref 3.5–5.1)
RBC # BLD AUTO: 4.75 M/UL (ref 4.6–6.2)
RBC MORPH BLD: NORMAL
SMUDGE CELLS BLD QL SMEAR: ABNORMAL
SODIUM SERPL-SCNC: 139 MMOL/L (ref 136–145)
WBC # BLD AUTO: 55.54 K/UL (ref 4.5–11)

## 2021-10-18 PROCEDURE — 80048 BASIC METABOLIC PANEL: ICD-10-PCS | Mod: ,,, | Performed by: CLINICAL MEDICAL LABORATORY

## 2021-10-18 PROCEDURE — 99214 PR OFFICE/OUTPT VISIT, EST, LEVL IV, 30-39 MIN: ICD-10-PCS | Mod: ,,, | Performed by: FAMILY MEDICINE

## 2021-10-18 PROCEDURE — 85025 COMPLETE CBC W/AUTO DIFF WBC: CPT | Mod: ,,, | Performed by: CLINICAL MEDICAL LABORATORY

## 2021-10-18 PROCEDURE — 93010 PR ELECTROCARDIOGRAM REPORT: ICD-10-PCS | Mod: ,,, | Performed by: FAMILY MEDICINE

## 2021-10-18 PROCEDURE — 93010 ELECTROCARDIOGRAM REPORT: CPT | Mod: ,,, | Performed by: FAMILY MEDICINE

## 2021-10-18 PROCEDURE — 93005 ELECTROCARDIOGRAM TRACING: CPT | Mod: RHCUB | Performed by: FAMILY MEDICINE

## 2021-10-18 PROCEDURE — 99214 OFFICE O/P EST MOD 30 MIN: CPT | Mod: ,,, | Performed by: FAMILY MEDICINE

## 2021-10-18 PROCEDURE — 85025 CBC WITH DIFFERENTIAL: ICD-10-PCS | Mod: ,,, | Performed by: CLINICAL MEDICAL LABORATORY

## 2021-10-18 PROCEDURE — 80048 BASIC METABOLIC PNL TOTAL CA: CPT | Mod: ,,, | Performed by: CLINICAL MEDICAL LABORATORY

## 2021-10-18 RX ORDER — HYDRALAZINE HYDROCHLORIDE 25 MG/1
TABLET, FILM COATED ORAL
Qty: 90 TABLET | Refills: 2 | Status: SHIPPED | OUTPATIENT
Start: 2021-10-18 | End: 2021-11-17

## 2021-11-11 ENCOUNTER — TELEPHONE (OUTPATIENT)
Dept: FAMILY MEDICINE | Facility: CLINIC | Age: 81
End: 2021-11-11
Payer: MEDICARE

## 2021-11-11 RX ORDER — PREDNISONE 20 MG/1
20 TABLET ORAL DAILY
COMMUNITY
Start: 2021-10-25 | End: 2022-03-23

## 2021-11-11 RX ORDER — DOFETILIDE 0.25 MG/1
1 CAPSULE ORAL 2 TIMES DAILY
COMMUNITY
Start: 2021-11-10

## 2021-11-12 ENCOUNTER — OFFICE VISIT (OUTPATIENT)
Dept: FAMILY MEDICINE | Facility: CLINIC | Age: 81
End: 2021-11-12
Payer: MEDICARE

## 2021-11-12 VITALS
TEMPERATURE: 98 F | DIASTOLIC BLOOD PRESSURE: 80 MMHG | BODY MASS INDEX: 29.3 KG/M2 | HEIGHT: 70 IN | HEART RATE: 66 BPM | WEIGHT: 204.63 LBS | OXYGEN SATURATION: 96 % | SYSTOLIC BLOOD PRESSURE: 140 MMHG | RESPIRATION RATE: 16 BRPM

## 2021-11-12 DIAGNOSIS — I25.2 HISTORY OF MYOCARDIAL INFARCTION: Chronic | ICD-10-CM

## 2021-11-12 DIAGNOSIS — I48.91 ATRIAL FIBRILLATION, UNSPECIFIED TYPE: Chronic | ICD-10-CM

## 2021-11-12 DIAGNOSIS — I10 PRIMARY HYPERTENSION: Primary | Chronic | ICD-10-CM

## 2021-11-12 DIAGNOSIS — K21.9 GASTROESOPHAGEAL REFLUX DISEASE WITHOUT ESOPHAGITIS: Chronic | ICD-10-CM

## 2021-11-12 DIAGNOSIS — E78.2 MIXED HYPERLIPIDEMIA: Chronic | ICD-10-CM

## 2021-11-12 DIAGNOSIS — G47.33 OSA ON CPAP: Chronic | ICD-10-CM

## 2021-11-12 PROCEDURE — 99495 TCM SERVICES (MODERATE COMPLEXITY): ICD-10-PCS | Mod: ,,, | Performed by: FAMILY MEDICINE

## 2021-11-12 PROCEDURE — 99495 TRANSJ CARE MGMT MOD F2F 14D: CPT | Mod: ,,, | Performed by: FAMILY MEDICINE

## 2021-11-12 RX ORDER — DOFETILIDE 0.25 MG/1
250 CAPSULE ORAL
COMMUNITY
Start: 2021-11-10 | End: 2022-10-24 | Stop reason: SDUPTHER

## 2021-11-17 ENCOUNTER — OFFICE VISIT (OUTPATIENT)
Dept: SLEEP MEDICINE | Facility: CLINIC | Age: 81
End: 2021-11-17
Payer: MEDICARE

## 2021-11-17 VITALS
WEIGHT: 202.81 LBS | HEART RATE: 64 BPM | DIASTOLIC BLOOD PRESSURE: 72 MMHG | HEIGHT: 70 IN | SYSTOLIC BLOOD PRESSURE: 149 MMHG | OXYGEN SATURATION: 98 % | BODY MASS INDEX: 29.03 KG/M2

## 2021-11-17 DIAGNOSIS — G47.33 OSA ON CPAP: Primary | Chronic | ICD-10-CM

## 2021-11-17 DIAGNOSIS — I48.91 ATRIAL FIBRILLATION, UNSPECIFIED TYPE: Chronic | ICD-10-CM

## 2021-11-17 PROCEDURE — 99212 OFFICE O/P EST SF 10 MIN: CPT | Mod: S$PBB,,, | Performed by: FAMILY MEDICINE

## 2021-11-17 PROCEDURE — 99212 PR OFFICE/OUTPT VISIT, EST, LEVL II, 10-19 MIN: ICD-10-PCS | Mod: S$PBB,,, | Performed by: FAMILY MEDICINE

## 2021-11-17 PROCEDURE — 99213 OFFICE O/P EST LOW 20 MIN: CPT | Mod: PBBFAC,PN | Performed by: FAMILY MEDICINE

## 2021-11-17 RX ORDER — MULTIVITAMIN
1 TABLET ORAL DAILY
COMMUNITY

## 2022-01-05 LAB
LEFT EYE DM RETINOPATHY: NEGATIVE
RIGHT EYE DM RETINOPATHY: NEGATIVE

## 2022-02-25 DIAGNOSIS — E78.2 MIXED HYPERLIPIDEMIA: Primary | ICD-10-CM

## 2022-02-25 RX ORDER — ATORVASTATIN CALCIUM 40 MG/1
40 TABLET, FILM COATED ORAL NIGHTLY
Qty: 90 TABLET | Refills: 1 | Status: SHIPPED | OUTPATIENT
Start: 2022-02-25 | End: 2022-03-01 | Stop reason: SDUPTHER

## 2022-03-01 ENCOUNTER — OFFICE VISIT (OUTPATIENT)
Dept: FAMILY MEDICINE | Facility: CLINIC | Age: 82
End: 2022-03-01
Payer: MEDICARE

## 2022-03-01 VITALS
HEART RATE: 79 BPM | RESPIRATION RATE: 16 BRPM | SYSTOLIC BLOOD PRESSURE: 143 MMHG | OXYGEN SATURATION: 95 % | BODY MASS INDEX: 29.66 KG/M2 | TEMPERATURE: 98 F | WEIGHT: 207.19 LBS | HEIGHT: 70 IN | DIASTOLIC BLOOD PRESSURE: 76 MMHG

## 2022-03-01 DIAGNOSIS — E83.42 HYPOMAGNESEMIA: Chronic | ICD-10-CM

## 2022-03-01 DIAGNOSIS — G47.33 OSA ON CPAP: Chronic | ICD-10-CM

## 2022-03-01 DIAGNOSIS — K21.9 GASTROESOPHAGEAL REFLUX DISEASE WITHOUT ESOPHAGITIS: Chronic | ICD-10-CM

## 2022-03-01 DIAGNOSIS — I10 PRIMARY HYPERTENSION: Primary | Chronic | ICD-10-CM

## 2022-03-01 DIAGNOSIS — I48.19 PERSISTENT ATRIAL FIBRILLATION: Chronic | ICD-10-CM

## 2022-03-01 DIAGNOSIS — E78.2 MIXED HYPERLIPIDEMIA: Chronic | ICD-10-CM

## 2022-03-01 PROBLEM — I95.1 ORTHOSTATIC HYPOTENSION: Chronic | Status: ACTIVE | Noted: 2021-06-17

## 2022-03-01 LAB
ANION GAP SERPL CALCULATED.3IONS-SCNC: 12 MMOL/L (ref 7–16)
BILIRUB UR QL STRIP: NEGATIVE
BUN SERPL-MCNC: 14 MG/DL (ref 7–18)
BUN/CREAT SERPL: 13 (ref 6–20)
CALCIUM SERPL-MCNC: 8.8 MG/DL (ref 8.5–10.1)
CHLORIDE SERPL-SCNC: 101 MMOL/L (ref 98–107)
CHOLEST SERPL-MCNC: 113 MG/DL (ref 0–200)
CHOLEST/HDLC SERPL: 4.2 {RATIO}
CLARITY UR: CLEAR
CO2 SERPL-SCNC: 28 MMOL/L (ref 21–32)
COLOR UR: YELLOW
CREAT SERPL-MCNC: 1.09 MG/DL (ref 0.7–1.3)
GLUCOSE SERPL-MCNC: 103 MG/DL (ref 74–106)
GLUCOSE UR STRIP-MCNC: NEGATIVE MG/DL
HDLC SERPL-MCNC: 27 MG/DL (ref 40–60)
KETONES UR STRIP-SCNC: NEGATIVE MG/DL
LDLC SERPL CALC-MCNC: 10 MG/DL
LDLC/HDLC SERPL: 0.4 {RATIO}
LEUKOCYTE ESTERASE UR QL STRIP: NEGATIVE
MAGNESIUM SERPL-MCNC: 2.2 MG/DL (ref 1.7–2.3)
NITRITE UR QL STRIP: NEGATIVE
NONHDLC SERPL-MCNC: 86 MG/DL
PH UR STRIP: 6 PH UNITS
POTASSIUM SERPL-SCNC: 4.8 MMOL/L (ref 3.5–5.1)
PROT UR QL STRIP: NEGATIVE
RBC # UR STRIP: NEGATIVE /UL
SODIUM SERPL-SCNC: 136 MMOL/L (ref 136–145)
SP GR UR STRIP: 1.01
TRIGL SERPL-MCNC: 378 MG/DL (ref 35–150)
UROBILINOGEN UR STRIP-ACNC: 0.2 MG/DL
VLDLC SERPL-MCNC: 76 MG/DL

## 2022-03-01 PROCEDURE — 83735 ASSAY OF MAGNESIUM: CPT | Mod: ,,, | Performed by: CLINICAL MEDICAL LABORATORY

## 2022-03-01 PROCEDURE — 80061 LIPID PANEL: CPT | Mod: ,,, | Performed by: CLINICAL MEDICAL LABORATORY

## 2022-03-01 PROCEDURE — 82570 ASSAY OF URINE CREATININE: CPT | Mod: ,,, | Performed by: CLINICAL MEDICAL LABORATORY

## 2022-03-01 PROCEDURE — 82043 UR ALBUMIN QUANTITATIVE: CPT | Mod: ,,, | Performed by: CLINICAL MEDICAL LABORATORY

## 2022-03-01 PROCEDURE — 83735 MAGNESIUM: ICD-10-PCS | Mod: ,,, | Performed by: CLINICAL MEDICAL LABORATORY

## 2022-03-01 PROCEDURE — 81003 URINALYSIS, REFLEX TO URINE CULTURE: ICD-10-PCS | Mod: QW,,, | Performed by: CLINICAL MEDICAL LABORATORY

## 2022-03-01 PROCEDURE — 80048 BASIC METABOLIC PNL TOTAL CA: CPT | Mod: ,,, | Performed by: CLINICAL MEDICAL LABORATORY

## 2022-03-01 PROCEDURE — 82043 MICROALBUMIN / CREATININE RATIO URINE: ICD-10-PCS | Mod: ,,, | Performed by: CLINICAL MEDICAL LABORATORY

## 2022-03-01 PROCEDURE — 80048 BASIC METABOLIC PANEL: ICD-10-PCS | Mod: ,,, | Performed by: CLINICAL MEDICAL LABORATORY

## 2022-03-01 PROCEDURE — 80061 LIPID PANEL: ICD-10-PCS | Mod: ,,, | Performed by: CLINICAL MEDICAL LABORATORY

## 2022-03-01 PROCEDURE — 81003 URINALYSIS AUTO W/O SCOPE: CPT | Mod: QW,,, | Performed by: CLINICAL MEDICAL LABORATORY

## 2022-03-01 PROCEDURE — 82570 MICROALBUMIN / CREATININE RATIO URINE: ICD-10-PCS | Mod: ,,, | Performed by: CLINICAL MEDICAL LABORATORY

## 2022-03-01 PROCEDURE — 99214 PR OFFICE/OUTPT VISIT, EST, LEVL IV, 30-39 MIN: ICD-10-PCS | Mod: ,,, | Performed by: FAMILY MEDICINE

## 2022-03-01 PROCEDURE — 99214 OFFICE O/P EST MOD 30 MIN: CPT | Mod: ,,, | Performed by: FAMILY MEDICINE

## 2022-03-01 RX ORDER — ATORVASTATIN CALCIUM 40 MG/1
40 TABLET, FILM COATED ORAL NIGHTLY
Qty: 90 TABLET | Refills: 1 | Status: SHIPPED | OUTPATIENT
Start: 2022-03-01 | End: 2022-08-30 | Stop reason: SDUPTHER

## 2022-03-01 NOTE — PROGRESS NOTES
John Victor MD    [address] [place] [address]     PATIENT NAME: Vaibhav Tong  : 1940  DATE: 3/1/22  MRN: [id number]      Billing Provider: John Victor MD  Level of Service: [place] OFFICE/OUTPT VISIT, EST, LEVL IV, 30-39 MIN  Patient PCP Information     Provider PCP Type    John Victor MD General          Reason for Visit / Chief Complaint: Follow-up (Medication refills)       Update PCP  Update Chief Complaint         History of Present Illness / Problem Focused Workflow     Vaibhav Tong presents to the clinic with Follow-up (Medication refills)     Dr. Garg is his Cardiologist    Dr. Metcalf is his Electrophysiologist       HPI    Review of Systems     Review of Systems   Constitutional: Negative for activity change, appetite change, chills, fatigue and fever.   HENT: Negative for nasal congestion, ear pain, hearing loss, postnasal drip and sore throat.    Respiratory: Negative for cough, chest tightness, shortness of breath and wheezing.    Cardiovascular: Negative for chest pain, palpitations, leg swelling and claudication.   Gastrointestinal: Negative for abdominal pain, change in bowel habit, constipation, diarrhea, nausea, vomiting and change in bowel habit.   Genitourinary: Negative for dysuria.   Musculoskeletal: Negative for arthralgias, back pain, gait problem and myalgias.   Integumentary:  Negative for rash.   Neurological: Negative for weakness and headaches.   Psychiatric/Behavioral: Negative for suicidal ideas. The patient is not nervous/anxious.         Medical / Social / Family History     Past Medical History:   Diagnosis Date    Atrial fibrillation     Hyperlipidemia     Hypertension        Past Surgical History:   Procedure Laterality Date    abaltion      APPENDECTOMY         Social History    reports that he has never smoked. He has never used smokeless tobacco. He reports that he does not drink alcohol and  does not use drugs.    Family History  's family history includes Heart disease in his brother; No Known Problems in his father and mother.    Medications and Allergies     Medications  Outpatient Medications Marked as Taking for the 3/1/22 encounter (Office Visit) with John Victor MD   Medication Sig Dispense Refill    aspirin (ECOTRIN) 81 MG EC tablet Take 81 mg by mouth.      clopidogreL (PLAVIX) 75 mg tablet Take 75 mg by mouth once daily.      dofetilide (TIKOSYN) 250 MCG Cap Take 1 capsule by mouth 2 (two) times a day.      dofetilide (TIKOSYN) 250 MCG Cap Take 250 mcg by mouth.      finasteride (PROSCAR) 5 mg tablet Take 5 mg by mouth nightly.       irbesartan (AVAPRO) 300 MG tablet Take 150 mg by mouth once daily.      metoprolol succinate (TOPROL-XL) 25 MG 24 hr tablet Take 25 mg by mouth 2 (two) times daily.      multivitamin (THERAGRAN) per tablet Take 1 tablet by mouth once daily.      pantoprazole (PROTONIX) 40 MG tablet Take 1 tablet (40 mg total) by mouth every morning. For REFLUX 90 tablet 1    tamsulosin (FLOMAX) 0.4 mg Cap Take by mouth once daily.      [DISCONTINUED] atorvastatin (LIPITOR) 40 MG tablet Take 1 tablet (40 mg total) by mouth nightly. 90 tablet 1       Allergies  Review of patient's allergies indicates:   Allergen Reactions    Ace inhibitors     Codeine        Physical Examination     Vitals:    03/01/22 1003   BP: (!) 143/76   Pulse:    Resp:    Temp:      Physical Exam  Vitals and nursing note reviewed.   Constitutional:       General: He is not in acute distress.     Appearance: Normal appearance. He is not ill-appearing.   Eyes:      Extraocular Movements: Extraocular movements intact.      Pupils: Pupils are equal, round, and reactive to light.   Cardiovascular:      Rate and Rhythm: Normal rate and regular rhythm.      Heart sounds: Normal heart sounds.   Pulmonary:      Effort: Pulmonary effort is normal.      Breath sounds: Normal breath sounds.    Abdominal:      General: Bowel sounds are normal.      Palpations: Abdomen is soft.   Musculoskeletal:         General: Normal range of motion.   Skin:     Findings: No rash.   Neurological:      General: No focal deficit present.      Mental Status: He is alert and oriented to person, place, and time. Mental status is at baseline.   Psychiatric:         Mood and Affect: Mood normal.         Behavior: Behavior normal.          Assessment and Plan (including Health Maintenance)      Problem List  Smart Sets  Document Outside HM   :    Plan:         Health Maintenance Due   Topic Date Due    Lipid Panel  Never done       Problem List Items Addressed This Visit        Cardiac/Vascular    Hypertension - Primary (Chronic)    Relevant Orders    Basic Metabolic Panel    Lipid Panel    Microalbumin/Creatinine Ratio, Urine    Urinalysis, Reflex to Urine Culture Urine, Clean Catch    Atrial fibrillation (Chronic)    Hyperlipidemia (Chronic)    Relevant Medications    atorvastatin (LIPITOR) 40 MG tablet    Other Relevant Orders    Lipid Panel       GI    Gastroesophageal reflux disease without esophagitis (Chronic)       Other    ARDEN on CPAP (Chronic)      Other Visit Diagnoses     Hypomagnesemia  (Chronic)       Relevant Orders    Magnesium        Primary hypertension  -     Basic Metabolic Panel; Future; Expected date: 03/01/2022  -     Lipid Panel; Future; Expected date: 03/01/2022  -     Microalbumin/Creatinine Ratio, Urine; Future; Expected date: 03/01/2022  -     Urinalysis, Reflex to Urine Culture Urine, Clean Catch; Future; Expected date: 03/01/2022    Mixed hyperlipidemia  -     atorvastatin (LIPITOR) 40 MG tablet; Take 1 tablet (40 mg total) by mouth nightly.  Dispense: 90 tablet; Refill: 1  -     Lipid Panel; Future; Expected date: 03/01/2022    Persistent atrial fibrillation    Hypomagnesemia  -     Magnesium; Future; Expected date: 03/01/2022    Gastroesophageal reflux disease without esophagitis    ARDEN on  CPAP       The patient has no Health Maintenance topics of status Not Due    Procedures     Future Appointments   Date Time Provider Department Center   5/2/2022 11:00 AM AWV NURSE, [facility] FAMILY MEDICINE [facility] FAM[facility] [place]   5/4/2022 11:15 AM Tera Jones DO [facility] SLEEP [place]   7/13/2022 10:30 [facility] [address] [place] [address] VASCUS [facility]    7/13/2022 11:00 AM [facility]        Follow up in about 6 months (around 9/1/2022), or if symptoms worsen or fail to improve, for chronic health problems.     Signature:  John Victor MD  [address] [place] [address]  Phone #: [phone]  Fax #: [phone]    Date of encounter: 3/1/22    There are no Patient Instructions on file for this visit.

## 2022-03-02 LAB
CREAT UR-MCNC: 55 MG/DL (ref 39–259)
MICROALBUMIN UR-MCNC: 0.6 MG/DL (ref 0–2.8)
MICROALBUMIN/CREAT RATIO PNL UR: 10.9 MG/G (ref 0–30)

## 2022-03-11 DIAGNOSIS — Z71.89 COMPLEX CARE COORDINATION: ICD-10-CM

## 2022-03-23 ENCOUNTER — OFFICE VISIT (OUTPATIENT)
Dept: SLEEP MEDICINE | Facility: CLINIC | Age: 82
End: 2022-03-23
Attending: FAMILY MEDICINE
Payer: MEDICARE

## 2022-03-23 VITALS
DIASTOLIC BLOOD PRESSURE: 76 MMHG | HEIGHT: 70 IN | SYSTOLIC BLOOD PRESSURE: 160 MMHG | OXYGEN SATURATION: 99 % | HEART RATE: 60 BPM | WEIGHT: 210 LBS | BODY MASS INDEX: 30.06 KG/M2

## 2022-03-23 DIAGNOSIS — I48.91 ATRIAL FIBRILLATION, UNSPECIFIED TYPE: Chronic | ICD-10-CM

## 2022-03-23 DIAGNOSIS — G47.33 OSA ON CPAP: Primary | Chronic | ICD-10-CM

## 2022-03-23 PROCEDURE — 99212 PR OFFICE/OUTPT VISIT, EST, LEVL II, 10-19 MIN: ICD-10-PCS | Mod: S$PBB,,, | Performed by: FAMILY MEDICINE

## 2022-03-23 PROCEDURE — 99212 OFFICE O/P EST SF 10 MIN: CPT | Mod: S$PBB,,, | Performed by: FAMILY MEDICINE

## 2022-03-23 PROCEDURE — 99214 OFFICE O/P EST MOD 30 MIN: CPT | Mod: PBBFAC,PN | Performed by: FAMILY MEDICINE

## 2022-03-23 NOTE — PROGRESS NOTES
Subjective:       Patient ID: Vaibhav Tong is a 82 y.o. male.    Chief Complaint: Sleep Apnea (CPAP management)    Patient follows up in sleep clinic for CPAP management and has received a new CPAP.  Compliance is 100% with an average AHI of 0.4 at a pressure of 10 cm H2O.  [place] score is 7 and the patient is using and benefitting from CPAP.  Patient's initial AHI was 22.1 with a low O2 saturation of 75%.  The patient uses a fullface mask.    Review of Systems   Constitutional: Negative for fatigue.        Negative EDS   Respiratory: Negative for apnea.    Psychiatric/Behavioral: Negative for sleep disturbance.         Objective:      Physical Exam  Cardiovascular:      Rate and Rhythm: Normal rate and regular rhythm.      Heart sounds: No murmur heard.  Pulmonary:      Breath sounds: Normal breath sounds.   Skin:     General: Skin is warm and dry.   Neurological:      Mental Status: He is alert and oriented to person, place, and time.         Assessment:       Problem List Items Addressed This Visit        Cardiac/Vascular    Atrial fibrillation (Chronic)       Other    ARDEN on CPAP - Primary (Chronic)          Plan:       1. Continue CPAP @ 10 cm H20  2. Caution while operating a motor vehicle  3. Prescription for new supplies  4. Follow up sleep clinic in 1 year.

## 2022-03-23 NOTE — PROCEDURES
Procedures  Patient presents to clinic for CPAP management.  Patient received new replacement machine.  No problems with machine or mask.  ESS is 7.  Patient is 100% compliant with and AHI of 0.4.  Patient wears FFM with non-heated tubing.  [facility] [place]

## 2022-03-31 DIAGNOSIS — K21.9 GASTROESOPHAGEAL REFLUX DISEASE WITHOUT ESOPHAGITIS: Chronic | ICD-10-CM

## 2022-03-31 RX ORDER — PANTOPRAZOLE SODIUM 40 MG/1
40 TABLET, DELAYED RELEASE ORAL EVERY MORNING
Qty: 90 TABLET | Refills: 1 | Status: SHIPPED | OUTPATIENT
Start: 2022-03-31 | End: 2022-09-29 | Stop reason: SDUPTHER

## 2022-03-31 NOTE — TELEPHONE ENCOUNTER
----- Message from Jina Enriquez sent at 3/30/2022 10:03 AM CDT -----  Please call in for Pts pantoprazole (PROTONIX) 40 MG to [facility]

## 2022-04-14 ENCOUNTER — TELEPHONE (OUTPATIENT)
Dept: FAMILY MEDICINE | Facility: CLINIC | Age: 82
End: 2022-04-14
Payer: MEDICARE

## 2022-04-14 NOTE — TELEPHONE ENCOUNTER
----- Message from Eileen Collado sent at 4/14/2022  8:54 AM CDT -----   [facility] recommends a new prescription be written for Hydralazine 25MG. Prescription benefit covers a 90 day supply versus a 30 day supply; sent to [facility].

## 2022-04-18 ENCOUNTER — TELEPHONE (OUTPATIENT)
Dept: FAMILY MEDICINE | Facility: CLINIC | Age: 82
End: 2022-04-18
Payer: MEDICARE

## 2022-04-18 NOTE — TELEPHONE ENCOUNTER
----- Message from Eileen Plunkett sent at 4/18/2022 11:13 AM CDT -----  Patient would like a refill of hydralazine 25 mg sent to [facility]. I was unable to see it in his chart, but thought you would be able to go farther back. Good call back number is [phone].

## 2022-04-19 DIAGNOSIS — I10 PRIMARY HYPERTENSION: Primary | ICD-10-CM

## 2022-04-19 RX ORDER — HYDRALAZINE HYDROCHLORIDE 10 MG/1
TABLET, FILM COATED ORAL
Qty: 270 TABLET | Refills: 1 | Status: SHIPPED | OUTPATIENT
Start: 2022-04-19 | End: 2022-04-21 | Stop reason: CLARIF

## 2022-04-19 NOTE — TELEPHONE ENCOUNTER
Patient called requesting refill on medication. It had been discontinued on his medication list for some reason.

## 2022-04-21 ENCOUNTER — TELEPHONE (OUTPATIENT)
Dept: FAMILY MEDICINE | Facility: CLINIC | Age: 82
End: 2022-04-21
Payer: MEDICARE

## 2022-04-21 RX ORDER — HYDRALAZINE HYDROCHLORIDE 25 MG/1
25 TABLET, FILM COATED ORAL 3 TIMES DAILY
COMMUNITY
Start: 2022-03-24 | End: 2022-06-20 | Stop reason: DRUGHIGH

## 2022-04-21 NOTE — TELEPHONE ENCOUNTER
[facility] called and states that patient transferred medications over and they had two different Hydralazine prescriptions. Patient has previously been taking 25 mg TID and we sent in 10 mg TID. Patient is supposed to be taking the 25 mg, we made an error. Updating that in his chart now.

## 2022-04-30 ENCOUNTER — EXTERNAL CHRONIC CARE MANAGEMENT (OUTPATIENT)
Dept: FAMILY MEDICINE | Facility: CLINIC | Age: 82
End: 2022-04-30
Payer: MEDICARE

## 2022-04-30 PROCEDURE — G0511 CCM/BHI BY RHC/FQHC 20MIN MO: HCPCS | Mod: ,,, | Performed by: FAMILY MEDICINE

## 2022-04-30 PROCEDURE — G0511 PR CHRONIC CARE MGMT, RHC OR FQHC ONLY, 20 MINS OR MORE: ICD-10-PCS | Mod: ,,, | Performed by: FAMILY MEDICINE

## 2022-05-02 ENCOUNTER — OFFICE VISIT (OUTPATIENT)
Dept: FAMILY MEDICINE | Facility: CLINIC | Age: 82
End: 2022-05-02
Payer: MEDICARE

## 2022-05-02 VITALS
RESPIRATION RATE: 18 BRPM | DIASTOLIC BLOOD PRESSURE: 84 MMHG | BODY MASS INDEX: 29.83 KG/M2 | SYSTOLIC BLOOD PRESSURE: 160 MMHG | OXYGEN SATURATION: 96 % | WEIGHT: 208.38 LBS | HEIGHT: 70 IN | HEART RATE: 64 BPM | TEMPERATURE: 98 F

## 2022-05-02 DIAGNOSIS — E78.2 MIXED HYPERLIPIDEMIA: Chronic | ICD-10-CM

## 2022-05-02 DIAGNOSIS — I10 PRIMARY HYPERTENSION: Primary | Chronic | ICD-10-CM

## 2022-05-02 DIAGNOSIS — I48.91 ATRIAL FIBRILLATION, UNSPECIFIED TYPE: Chronic | ICD-10-CM

## 2022-05-02 DIAGNOSIS — K21.9 GASTROESOPHAGEAL REFLUX DISEASE WITHOUT ESOPHAGITIS: Chronic | ICD-10-CM

## 2022-05-02 DIAGNOSIS — G47.33 OSA ON CPAP: Chronic | ICD-10-CM

## 2022-05-02 PROCEDURE — G0439 PR MEDICARE ANNUAL WELLNESS SUBSEQUENT VISIT: ICD-10-PCS | Mod: ,,, | Performed by: FAMILY MEDICINE

## 2022-05-02 PROCEDURE — G0439 PPPS, SUBSEQ VISIT: HCPCS | Mod: ,,, | Performed by: FAMILY MEDICINE

## 2022-05-02 NOTE — PROGRESS NOTES
[facility]   [facility] FAMILY MEDICINE      PATIENT NAME: Vaibhav Tong   : 1940    AGE: 82 y.o. DATE: 2022   MRN: [id number]        Reason for Visit / Chief Complaint: Medicare AWV Follow Up (Medicare subsequent AWV )        Vaibhav Tong presents for an subsequent Medicare AWV today.     The following components were reviewed and updated:    Medical/Social/Family History:  Past Medical History:   Diagnosis Date    Atrial fibrillation     Hyperlipidemia     Hypertension         Family History   Problem Relation Age of Onset    Hypertension Mother     No Known Problems Father     Heart disease Brother     Heart disease Sister     Hypertension Sister         Social History     Tobacco Use   Smoking Status Never Smoker   Smokeless Tobacco Never Used      Social History     Substance and Sexual Activity   Alcohol Use Never       Family History   Problem Relation Age of Onset    Hypertension Mother     No Known Problems Father     Heart disease Brother     Heart disease Sister     Hypertension Sister        Past Surgical History:   Procedure Laterality Date    abaltion      APPENDECTOMY           · Allergies and Current Medications     Review of patient's allergies indicates:   Allergen Reactions    Ace inhibitors     Codeine        Current Outpatient Medications:     aspirin (ECOTRIN) 81 MG EC tablet, Take 81 mg by mouth., Disp: , Rfl:     atorvastatin (LIPITOR) 40 MG tablet, Take 1 tablet (40 mg total) by mouth nightly., Disp: 90 tablet, Rfl: 1    clopidogreL (PLAVIX) 75 mg tablet, Take 75 mg by mouth once daily., Disp: , Rfl:     dofetilide (TIKOSYN) 250 MCG Cap, Take 1 capsule by mouth 2 (two) times a day., Disp: , Rfl:     finasteride (PROSCAR) 5 mg tablet, Take 5 mg by mouth nightly. , Disp: , Rfl:     hydrALAZINE (APRESOLINE) 25 MG tablet, Take 25 mg by mouth 3 (three) times daily., Disp: , Rfl:     irbesartan (AVAPRO) 300 MG tablet, Take 150 mg by mouth once daily., Disp:  , Rfl:     metoprolol succinate (TOPROL-XL) 25 MG 24 hr tablet, Take 25 mg by mouth 2 (two) times daily., Disp: , Rfl:     multivitamin (THERAGRAN) per tablet, Take 1 tablet by mouth once daily., Disp: , Rfl:     pantoprazole (PROTONIX) 40 MG tablet, Take 1 tablet (40 mg total) by mouth every morning. For REFLUX, Disp: 90 tablet, Rfl: 1    tamsulosin (FLOMAX) 0.4 mg Cap, Take by mouth once daily., Disp: , Rfl:     dofetilide (TIKOSYN) 250 MCG Cap, Take 250 mcg by mouth., Disp: , Rfl:     · Health Risk Assessment   Fall Risk: NO    Advance Directive:  Does not have an advanced directive. Verbal education and written education included in today's AVS.   Depression: [id number]    HTN:  DASH diet, exercise, weight management, med compliance, home BP monitoring, and follow-up discussed.   T2DM: NO   Tobacco use: no  STI: N/A    Alcohol misuse: No   Statin Use: yes      · Health Risk Assessment  What is your age?: 80 or older  Are you male or female?: Male  During the past four weeks, how much have you been bothered by emotional problems such as feeling anxious, depressed, irritable, sad, or downhearted and blue?: Slightly  During the past five weeks, has your physical and/or emotional health limited your social activities with family, friends, neighbors, or groups?: Slightly  During the past four weeks, how much bodily pain have you generally had?: No pain  During the past four weeks, was someone available to help if you needed and wanted help?: Yes, as much as I wanted  During the past four weeks, what was the hardest physical activity you could do for at least two minutes?: Moderate  Can you get to places out of walking distance without help?  (For example, can you travel alone on buses or taxis, or drive your own car?): Yes  Can you go shopping for groceries or clothes without someone's help?: Yes  Can you prepare your own meals?: Yes  Can you do your own housework without help?: Yes  Because of any health problems,  do you need the help of another person with your personal care needs such as eating, bathing, dressing, or getting around the house?: No  Can you handle your own money without help?: Yes  During the past four weeks, how would you rate your health in general?: Fair  How have things been going for you during the past four weeks?: Pretty well  Are you having difficulties driving your car?: No  Do you always fasten your seat belt when you are in a car?: Yes, usually  How often in the past four weeks have you been bothered by falling or dizzy when standing up?: Never  How often in the past four weeks have you been bothered by sexual problems?: Never  How often in the past four weeks have you been bothered by trouble eating well?: Never  How often in the past four weeks have you been bothered by teeth or denture problems?: Never  How often in the past four weeks have you been bothered with problems using the telephone?: Never  How often in the past four weeks have you been bothered by tiredness or fatigue?: Sometimes  Have you fallen two or more times in the past year?: No  Are you afraid of falling?: No  Are you a smoker?: No  During the past four weeks, how many drinks of wine, beer, or other alcoholic beverages did you have?: No alcohol at all  Do you exercise for about 20 minutes three or more days a week?: Yes, some of the time  Have you been given any information to help you with hazards in your house that might hurt you?: Yes  Have you been given any information to help you with keeping track of your medications?: No  How often do you have trouble taking medicines the way you've been told to take them?: I always take them as prescribed  How confident are you that you can control and manage most of your health problems?: Very confident  What is your race? (Check all that apply.):     · Health Maintenance       Health Maintenance Topics with due status: Not Due       Topic Last Completion Date    Lipid Panel  03/01/2022     There are no preventive care reminders to display for this patient.     Incontinence  Bowel: no  Bladder: no    Lab results available in Epic or see dates from [facility] above:   Lab Results   Component Value Date    CHOL 113 03/01/2022     Lab Results   Component Value Date    HDL 27 (L) 03/01/2022     Lab Results   Component Value Date    LDLCALC 10 03/01/2022     Lab Results   Component Value Date    TRIG 378 (H) 03/01/2022     Lab Results   Component Value Date    CHOLHDL 4.2 03/01/2022       No results found for: LABA1C, HGBA1C    Sodium   Date Value Ref Range Status   03/01/2022 136 136 - 145 mmol/L Final     Potassium   Date Value Ref Range Status   03/01/2022 4.8 3.5 - 5.1 mmol/L Final     Chloride   Date Value Ref Range Status   03/01/2022 101 98 - 107 mmol/L Final     CO2   Date Value Ref Range Status   03/01/2022 28 21 - 32 mmol/L Final     Glucose   Date Value Ref Range Status   03/01/2022 103 74 - 106 mg/dL Final     BUN   Date Value Ref Range Status   03/01/2022 14 7 - 18 mg/dL Final     Creatinine   Date Value Ref Range Status   03/01/2022 1.09 0.70 - 1.30 mg/dL Final     Calcium   Date Value Ref Range Status   03/01/2022 8.8 8.5 - 10.1 mg/dL Final     Total Protein   Date Value Ref Range Status   06/17/2021 6.9 6.4 - 8.2 g/dL Final     Albumin   Date Value Ref Range Status   06/17/2021 4.1 3.5 - 5.0 g/dL Final     Bilirubin, Total   Date Value Ref Range Status   06/17/2021 0.4 >0.0 - 1.2 mg/dL Final     Alk Phos   Date Value Ref Range Status   06/17/2021 74 45 - 115 U/L Final     AST   Date Value Ref Range Status   06/17/2021 21 15 - 37 U/L Final     ALT   Date Value Ref Range Status   06/17/2021 29 16 - 61 U/L Final     Anion Gap   Date Value Ref Range Status   03/01/2022 12 7 - 16 mmol/L Final     eGFR   Date Value Ref Range Status   03/01/2022 69 >=60 mL/min/1.73m² Final         No results found for: PSA (Delete this line if female pt)        · Care Team   PCP: [facility]    Eye specialist:  "Dr. Frias  Neurologist: N/A   GI: N/A   Cardiologist: Dr Garg at [facility], has appt Thursday   GYN: N/A   Pulmonologist: [facility]. Jimenez at [facility]   Urologist: Dr. Lee   Nephrologist: N/A   Ortho: N/A   Rheum: N/A   Dr. Jones sleep med, Dr. Forde for leukemia (if other)       **See Completed Assessments for Annual Wellness visit within the encounter summary    The following assessments were completed & reviewed:  · Depression Screening  · Cognitive function Screening  · Timed Get Up Test  · Whisper Test  · Vision Screen  · Health Risk Assessment  · Checklist of ADLs and IADLs    Heart healthy diet and exercise encouraged and education given.  Objective  Vitals:    05/02/22 1113 05/02/22 1121 05/02/22 1145   BP: (!) 163/81 (!) 167/81 (!) 160/84   Pulse: 64     Resp: 18     Temp: 98.4 °F (36.9 °C)     TempSrc: Oral     SpO2: 96%     Weight: 94.5 kg (208 lb 6.4 oz)     Height: 5' 10" (1.778 m)     PainSc: 0-No pain        Body mass index is 29.9 kg/m².  Ideal body weight: 73 kg (160 lb 15 oz)   Heart healthy diet and exercise encouraged and education given    Physical Exam  Vitals and nursing note reviewed.   Constitutional:       General: He is not in acute distress.     Appearance: Normal appearance. He is not ill-appearing.   Eyes:      Extraocular Movements: Extraocular movements intact.      Pupils: Pupils are equal, round, and reactive to light.   Cardiovascular:      Rate and Rhythm: Normal rate and regular rhythm.      Heart sounds: Normal heart sounds.   Pulmonary:      Effort: Pulmonary effort is normal.      Breath sounds: Normal breath sounds.   Abdominal:      General: Bowel sounds are normal.      Palpations: Abdomen is soft.   Musculoskeletal:         General: Normal range of motion.   Skin:     Findings: No rash.   Neurological:      General: No focal deficit present.      Mental Status: He is alert and oriented to person, place, and time. Mental status is at baseline. "   Psychiatric:         Mood and Affect: Mood normal.         Behavior: Behavior normal.           Assessment:     1. BMI 29.0-29.9,adult    2. Primary hypertension    3. Mixed hyperlipidemia    4. Atrial fibrillation, unspecified type    5. Gastroesophageal reflux disease without esophagitis    6. ARDEN on CPAP         Plan:    Referrals:        Advised to call office if does not hear from anyone with referral appt within 2-3 weeks to check on status of referral. Voiced understanding.        Discussed and provided with a screening schedule and personal prevention plan in accordance with USPSTF age appropriate recommendations and Medicare screening guidelines.   Education, counseling, and referrals were provided as needed.  After Visit Summary printed and given to patient which includes written education and a list of any referrals if indicated.     F/u plan for yearly AWV.    Signature: John Victor MD

## 2022-05-02 NOTE — PATIENT INSTRUCTIONS
Counseling and Referral of Other Preventative  (Italic type indicates deductible and co-insurance are waived)    Patient Name: Vaibhav Tong  Today's Date: 5/2/2022    Health Maintenance         Date Due Completion Date    COVID-19 Vaccine (4 - Booster for Moderna series) 06/01/2022 (Originally 3/2/2022) 11/2/2021    TETANUS VACCINE 03/01/2023 (Originally 1/26/1958) ---    Shingles Vaccine (2 of 3) 03/01/2023 (Originally 3/30/2012) 2/3/2012    Lipid Panel 03/01/2027 3/1/2022          No orders of the defined types were placed in this encounter.

## 2022-05-31 ENCOUNTER — EXTERNAL CHRONIC CARE MANAGEMENT (OUTPATIENT)
Dept: FAMILY MEDICINE | Facility: CLINIC | Age: 82
End: 2022-05-31
Payer: MEDICARE

## 2022-05-31 PROCEDURE — G0511 PR CHRONIC CARE MGMT, RHC OR FQHC ONLY, 20 MINS OR MORE: ICD-10-PCS | Mod: ,,, | Performed by: FAMILY MEDICINE

## 2022-05-31 PROCEDURE — G0511 CCM/BHI BY RHC/FQHC 20MIN MO: HCPCS | Mod: ,,, | Performed by: FAMILY MEDICINE

## 2022-06-20 DIAGNOSIS — I10 PRIMARY HYPERTENSION: Primary | ICD-10-CM

## 2022-06-20 RX ORDER — HYDRALAZINE HYDROCHLORIDE 50 MG/1
50 TABLET, FILM COATED ORAL EVERY 12 HOURS
Qty: 90 TABLET | Refills: 1 | Status: CANCELLED | OUTPATIENT
Start: 2022-06-20 | End: 2023-06-20

## 2022-06-20 RX ORDER — HYDRALAZINE HYDROCHLORIDE 25 MG/1
50 TABLET, FILM COATED ORAL EVERY 12 HOURS
Qty: 360 TABLET | Refills: 1 | Status: SHIPPED | OUTPATIENT
Start: 2022-06-20 | End: 2022-06-23 | Stop reason: SDUPTHER

## 2022-06-23 DIAGNOSIS — I10 PRIMARY HYPERTENSION: ICD-10-CM

## 2022-06-23 RX ORDER — HYDRALAZINE HYDROCHLORIDE 25 MG/1
50 TABLET, FILM COATED ORAL EVERY 12 HOURS
Qty: 360 TABLET | Refills: 1 | Status: SHIPPED | OUTPATIENT
Start: 2022-06-23 | End: 2023-06-23

## 2022-06-30 ENCOUNTER — EXTERNAL CHRONIC CARE MANAGEMENT (OUTPATIENT)
Dept: FAMILY MEDICINE | Facility: CLINIC | Age: 82
End: 2022-06-30
Payer: MEDICARE

## 2022-06-30 PROCEDURE — G0511 CCM/BHI BY RHC/FQHC 20MIN MO: HCPCS | Mod: ,,, | Performed by: FAMILY MEDICINE

## 2022-06-30 PROCEDURE — G0511 PR CHRONIC CARE MGMT, RHC OR FQHC ONLY, 20 MINS OR MORE: ICD-10-PCS | Mod: ,,, | Performed by: FAMILY MEDICINE

## 2022-07-13 ENCOUNTER — HOSPITAL ENCOUNTER (OUTPATIENT)
Dept: RADIOLOGY | Facility: HOSPITAL | Age: 82
Discharge: HOME | End: 2022-07-13
Attending: SURGERY
Payer: MEDICARE

## 2022-07-13 ENCOUNTER — OFFICE VISIT (OUTPATIENT)
Dept: SURGERY | Facility: CLINIC | Age: 82
End: 2022-07-13
Payer: MEDICARE

## 2022-07-13 VITALS — HEIGHT: 70 IN | WEIGHT: 207 LBS | BODY MASS INDEX: 29.63 KG/M2

## 2022-07-13 DIAGNOSIS — I65.23 BILATERAL CAROTID ARTERY STENOSIS: Primary | ICD-10-CM

## 2022-07-13 DIAGNOSIS — I65.23 BILATERAL CAROTID ARTERY STENOSIS: ICD-10-CM

## 2022-07-13 PROCEDURE — 93880 EXTRACRANIAL BILAT STUDY: CPT | Mod: TC

## 2022-07-13 PROCEDURE — 99212 OFFICE O/P EST SF 10 MIN: CPT | Mod: S$PBB,,, | Performed by: SURGERY

## 2022-07-13 PROCEDURE — 93880 US CAROTID BILATERAL: ICD-10-PCS | Mod: 26,,, | Performed by: SURGERY

## 2022-07-13 PROCEDURE — 93880 EXTRACRANIAL BILAT STUDY: CPT | Mod: 26,,, | Performed by: SURGERY

## 2022-07-13 PROCEDURE — 99212 PR OFFICE/OUTPT VISIT, EST, LEVL II, 10-19 MIN: ICD-10-PCS | Mod: S$PBB,,, | Performed by: SURGERY

## 2022-07-13 PROCEDURE — 99214 OFFICE O/P EST MOD 30 MIN: CPT | Mod: PBBFAC,25 | Performed by: SURGERY

## 2022-07-13 NOTE — PROGRESS NOTES
Vascular clinic    Asymptomatic carotid disease    Right ICA less 50% based on velocities, ratio 50-69%.  Similar previous study CT angio list of 50% right no significant disease of the left    One year ultrasound

## 2022-07-31 ENCOUNTER — EXTERNAL CHRONIC CARE MANAGEMENT (OUTPATIENT)
Dept: FAMILY MEDICINE | Facility: CLINIC | Age: 82
End: 2022-07-31
Payer: MEDICARE

## 2022-07-31 PROCEDURE — G0511 PR CHRONIC CARE MGMT, RHC OR FQHC ONLY, 20 MINS OR MORE: ICD-10-PCS | Mod: ,,, | Performed by: FAMILY MEDICINE

## 2022-07-31 PROCEDURE — G0511 CCM/BHI BY RHC/FQHC 20MIN MO: HCPCS | Mod: ,,, | Performed by: FAMILY MEDICINE

## 2022-08-17 PROCEDURE — 90853 GROUP PSYCHOTHERAPY: CPT | Mod: PO

## 2022-08-18 PROCEDURE — 90853 GROUP PSYCHOTHERAPY: CPT | Mod: PO

## 2022-08-22 PROCEDURE — 90853 GROUP PSYCHOTHERAPY: CPT | Mod: PO

## 2022-08-24 PROCEDURE — 90853 GROUP PSYCHOTHERAPY: CPT | Mod: PO

## 2022-08-25 PROCEDURE — 90853 GROUP PSYCHOTHERAPY: CPT | Mod: PO

## 2022-08-29 PROCEDURE — 90853 GROUP PSYCHOTHERAPY: CPT | Mod: PO

## 2022-08-30 DIAGNOSIS — E78.2 MIXED HYPERLIPIDEMIA: Chronic | ICD-10-CM

## 2022-08-30 RX ORDER — ATORVASTATIN CALCIUM 40 MG/1
40 TABLET, FILM COATED ORAL NIGHTLY
Qty: 90 TABLET | Refills: 1 | Status: SHIPPED | OUTPATIENT
Start: 2022-08-30 | End: 2022-09-04 | Stop reason: SDUPTHER

## 2022-08-30 NOTE — TELEPHONE ENCOUNTER
----- Message from Eileen Plunkett sent at 8/30/2022  3:12 PM CDT -----  Patient needs a refill of atorvastatin (LIPITOR) 40 MG tablet sent to [facility], good callback number for patient is [phone].

## 2022-08-31 ENCOUNTER — EXTERNAL CHRONIC CARE MANAGEMENT (OUTPATIENT)
Dept: FAMILY MEDICINE | Facility: CLINIC | Age: 82
End: 2022-08-31
Payer: MEDICARE

## 2022-08-31 PROCEDURE — G0511 CCM/BHI BY RHC/FQHC 20MIN MO: HCPCS | Mod: ,,, | Performed by: FAMILY MEDICINE

## 2022-08-31 PROCEDURE — 90853 GROUP PSYCHOTHERAPY: CPT | Mod: PO

## 2022-08-31 PROCEDURE — G0511 PR CHRONIC CARE MGMT, RHC OR FQHC ONLY, 20 MINS OR MORE: ICD-10-PCS | Mod: ,,, | Performed by: FAMILY MEDICINE

## 2022-09-01 PROCEDURE — 90853 GROUP PSYCHOTHERAPY: CPT | Mod: PO

## 2022-09-07 PROCEDURE — 90853 GROUP PSYCHOTHERAPY: CPT | Mod: PO

## 2022-09-08 PROCEDURE — 90853 GROUP PSYCHOTHERAPY: CPT | Mod: PO

## 2022-09-09 PROCEDURE — 90853 GROUP PSYCHOTHERAPY: CPT | Mod: PO

## 2022-09-12 PROCEDURE — 90853 GROUP PSYCHOTHERAPY: CPT | Mod: PO

## 2022-09-13 PROCEDURE — 90853 GROUP PSYCHOTHERAPY: CPT | Mod: PO

## 2022-09-15 PROCEDURE — 90853 GROUP PSYCHOTHERAPY: CPT | Mod: PO

## 2022-09-19 PROCEDURE — 90853 GROUP PSYCHOTHERAPY: CPT | Mod: PO

## 2022-09-20 PROCEDURE — 90853 GROUP PSYCHOTHERAPY: CPT | Mod: PO

## 2022-09-22 PROCEDURE — 90853 GROUP PSYCHOTHERAPY: CPT | Mod: PO

## 2022-09-26 PROCEDURE — 90853 GROUP PSYCHOTHERAPY: CPT | Mod: PO

## 2022-09-27 PROCEDURE — 90853 GROUP PSYCHOTHERAPY: CPT | Mod: PO

## 2022-09-29 DIAGNOSIS — K21.9 GASTROESOPHAGEAL REFLUX DISEASE WITHOUT ESOPHAGITIS: Chronic | ICD-10-CM

## 2022-09-29 RX ORDER — PANTOPRAZOLE SODIUM 40 MG/1
40 TABLET, DELAYED RELEASE ORAL EVERY MORNING
Qty: 90 TABLET | Refills: 1 | Status: SHIPPED | OUTPATIENT
Start: 2022-09-29

## 2022-09-30 ENCOUNTER — EXTERNAL CHRONIC CARE MANAGEMENT (OUTPATIENT)
Dept: FAMILY MEDICINE | Facility: CLINIC | Age: 82
End: 2022-09-30
Payer: MEDICARE

## 2022-09-30 PROCEDURE — G0511 PR CHRONIC CARE MGMT, RHC OR FQHC ONLY, 20 MINS OR MORE: ICD-10-PCS | Mod: ,,, | Performed by: FAMILY MEDICINE

## 2022-09-30 PROCEDURE — G0511 CCM/BHI BY RHC/FQHC 20MIN MO: HCPCS | Mod: ,,, | Performed by: FAMILY MEDICINE

## 2022-10-03 PROCEDURE — 90853 GROUP PSYCHOTHERAPY: CPT | Mod: PO

## 2022-10-04 PROCEDURE — 90853 GROUP PSYCHOTHERAPY: CPT | Mod: PO

## 2022-10-05 PROCEDURE — 90853 GROUP PSYCHOTHERAPY: CPT | Mod: PO

## 2022-10-10 PROCEDURE — 90853 GROUP PSYCHOTHERAPY: CPT | Mod: PO

## 2022-10-11 PROCEDURE — 90853 GROUP PSYCHOTHERAPY: CPT | Mod: PO

## 2022-10-13 PROCEDURE — 90853 GROUP PSYCHOTHERAPY: CPT | Mod: PO

## 2022-10-17 PROCEDURE — 90853 GROUP PSYCHOTHERAPY: CPT | Mod: PO

## 2022-10-18 PROCEDURE — 90853 GROUP PSYCHOTHERAPY: CPT | Mod: PO

## 2022-10-20 PROCEDURE — 90853 GROUP PSYCHOTHERAPY: CPT | Mod: PO

## 2022-10-24 ENCOUNTER — OFFICE VISIT (OUTPATIENT)
Dept: FAMILY MEDICINE | Facility: CLINIC | Age: 82
End: 2022-10-24
Payer: MEDICARE

## 2022-10-24 VITALS
WEIGHT: 204.19 LBS | HEIGHT: 70 IN | RESPIRATION RATE: 16 BRPM | OXYGEN SATURATION: 97 % | BODY MASS INDEX: 29.23 KG/M2 | HEART RATE: 67 BPM | DIASTOLIC BLOOD PRESSURE: 63 MMHG | SYSTOLIC BLOOD PRESSURE: 135 MMHG

## 2022-10-24 DIAGNOSIS — R05.9 COUGH, UNSPECIFIED TYPE: ICD-10-CM

## 2022-10-24 DIAGNOSIS — G47.33 OSA ON CPAP: Chronic | ICD-10-CM

## 2022-10-24 DIAGNOSIS — I48.91 ATRIAL FIBRILLATION, UNSPECIFIED TYPE: Chronic | ICD-10-CM

## 2022-10-24 DIAGNOSIS — I10 PRIMARY HYPERTENSION: Chronic | ICD-10-CM

## 2022-10-24 DIAGNOSIS — U07.1 COVID-19: Primary | ICD-10-CM

## 2022-10-24 DIAGNOSIS — J06.9 UPPER RESPIRATORY TRACT INFECTION, UNSPECIFIED TYPE: ICD-10-CM

## 2022-10-24 DIAGNOSIS — Z20.822 ENCOUNTER FOR LABORATORY TESTING FOR COVID-19 VIRUS: ICD-10-CM

## 2022-10-24 DIAGNOSIS — R52 GENERALIZED BODY ACHES: ICD-10-CM

## 2022-10-24 LAB
CTP QC/QA: YES
FLUAV AG NPH QL: NEGATIVE
FLUBV AG NPH QL: NEGATIVE
SARS-COV-2 AG RESP QL IA.RAPID: POSITIVE

## 2022-10-24 PROCEDURE — 87428 SARSCOV & INF VIR A&B AG IA: CPT | Mod: RHCUB | Performed by: FAMILY MEDICINE

## 2022-10-24 PROCEDURE — 99214 PR OFFICE/OUTPT VISIT, EST, LEVL IV, 30-39 MIN: ICD-10-PCS | Mod: CR,,, | Performed by: FAMILY MEDICINE

## 2022-10-24 PROCEDURE — 99214 OFFICE O/P EST MOD 30 MIN: CPT | Mod: CR,,, | Performed by: FAMILY MEDICINE

## 2022-10-24 RX ORDER — DEXBROMPHENIRAMINE MALEATE, DEXTROMETHORPHAN HBR, PHENYLEPHRINE HCL 2; 15; 7.5 MG/5ML; MG/5ML; MG/5ML
5 LIQUID ORAL 3 TIMES DAILY PRN
Qty: 120 ML | Refills: 0 | Status: SHIPPED | OUTPATIENT
Start: 2022-10-24 | End: 2023-01-10

## 2022-10-24 RX ORDER — AZITHROMYCIN 250 MG/1
TABLET, FILM COATED ORAL
Qty: 6 TABLET | Refills: 0 | Status: SHIPPED | OUTPATIENT
Start: 2022-10-24 | End: 2022-10-29

## 2022-10-24 RX ORDER — PREDNISONE 20 MG/1
20 TABLET ORAL DAILY
Qty: 7 TABLET | Refills: 0 | Status: SHIPPED | OUTPATIENT
Start: 2022-10-24 | End: 2023-01-05 | Stop reason: ALTCHOICE

## 2022-10-24 NOTE — ASSESSMENT & PLAN NOTE
Paxlovid is not an option due to his medications, treated as upper respiratory infection. He has received his primary vaccination series and one booster. Most likely this will be a mild case, but he is to report to the ER if he has any SOB, and let me know if he has any questions.

## 2022-10-24 NOTE — PROGRESS NOTES
John Victor MD    [address] [place] [address]     PATIENT NAME: Vaibhav Tong  : 1940  DATE: 10/24/22  MRN: [id number]      Billing Provider: John Victor MD  Level of Service: VA OFFICE/OUTPT VISIT, EST, LEVL IV, 30-39 MIN  Patient PCP Information       Provider PCP Type    John Victor MD General            Reason for Visit / Chief Complaint: Generalized Body Aches (Pt complains of dry cough, body aches, and fever that began yesterday.)       Update PCP  Update Chief Complaint         History of Present Illness / Problem Focused Workflow     Vaibhav Tong presents to the clinic with Generalized Body Aches (Pt complains of dry cough, body aches, and fever that began yesterday.)     HPI    Review of Systems     Review of Systems   Constitutional:  Negative for activity change, appetite change, chills, fatigue and fever.   HENT:  Positive for nasal congestion. Negative for ear pain, hearing loss, postnasal drip and sore throat.    Respiratory:  Positive for cough. Negative for chest tightness, shortness of breath and wheezing.    Cardiovascular:  Negative for chest pain, palpitations, leg swelling and claudication.   Gastrointestinal:  Negative for abdominal pain, change in bowel habit, constipation, diarrhea, nausea, vomiting and change in bowel habit.   Genitourinary:  Negative for dysuria.   Musculoskeletal:  Negative for arthralgias, back pain, gait problem and myalgias.   Integumentary:  Negative for rash.   Neurological:  Negative for weakness and headaches.   Psychiatric/Behavioral:  Negative for suicidal ideas. The patient is not nervous/anxious.       Medical / Social / Family History     Past Medical History:   Diagnosis Date    Atrial fibrillation     Hyperlipidemia     Hypertension        Past Surgical History:   Procedure Laterality Date    abaltion      APPENDECTOMY         Social History    reports that he has never smoked.  He has never used smokeless tobacco. He reports that he does not drink alcohol and does not use drugs.    Family History  's family history includes Heart disease in his brother and sister; Hypertension in his mother and sister; No Known Problems in his father.    Medications and Allergies     Medications  Outpatient Medications Marked as Taking for the 10/24/22 encounter (Office Visit) with John Victor MD   Medication Sig Dispense Refill    aspirin (ECOTRIN) 81 MG EC tablet Take 81 mg by mouth.      atorvastatin (LIPITOR) 40 MG tablet Take 1 tablet (40 mg total) by mouth nightly. 90 tablet 1    clopidogreL (PLAVIX) 75 mg tablet Take 75 mg by mouth once daily.      dofetilide (TIKOSYN) 250 MCG Cap Take 1 capsule by mouth 2 (two) times a day.      finasteride (PROSCAR) 5 mg tablet Take 5 mg by mouth nightly.       hydrALAZINE (APRESOLINE) 25 MG tablet Take 2 tablets (50 mg total) by mouth every 12 (twelve) hours. For BLOOD PRESSURE 360 tablet 1    irbesartan (AVAPRO) 300 MG tablet Take 150 mg by mouth once daily.      metoprolol succinate (TOPROL-XL) 25 MG 24 hr tablet Take 25 mg by mouth 2 (two) times daily.      multivitamin (THERAGRAN) per tablet Take 1 tablet by mouth once daily.      pantoprazole (PROTONIX) 40 MG tablet Take 1 tablet (40 mg total) by mouth every morning. For REFLUX 90 tablet 1    tamsulosin (FLOMAX) 0.4 mg Cap Take by mouth once daily.         Allergies  Review of patient's allergies indicates:   Allergen Reactions    Ace inhibitors     Codeine        Physical Examination     Vitals:    10/24/22 1029   BP: 135/63   Pulse: 67   Resp: 16     Physical Exam  Vitals and nursing note reviewed.   Constitutional:       General: He is not in acute distress.     Appearance: Normal appearance. He is not ill-appearing.   Eyes:      Extraocular Movements: Extraocular movements intact.      Pupils: Pupils are equal, round, and reactive to light.   Cardiovascular:      Rate and Rhythm: Normal rate  and regular rhythm.      Heart sounds: Normal heart sounds.   Pulmonary:      Effort: Pulmonary effort is normal.      Breath sounds: Normal breath sounds.   Abdominal:      General: Bowel sounds are normal.      Palpations: Abdomen is soft.   Musculoskeletal:         General: Normal range of motion.   Skin:     Findings: No rash.   Neurological:      General: No focal deficit present.      Mental Status: He is alert and oriented to person, place, and time. Mental status is at baseline.   Psychiatric:         Mood and Affect: Mood normal.         Behavior: Behavior normal.          Assessment and Plan (including Health Maintenance)      Problem List  Smart Sets  Document Outside HM   :    Plan:         Health Maintenance Due   Topic Date Due    Eye Exam  Never done    COVID-19 Vaccine (4 - Booster for Moderna series) 12/28/2021    Influenza Vaccine (1) 09/01/2022       Problem List Items Addressed This Visit          Cardiac/Vascular    Hypertension (Chronic)    Atrial fibrillation (Chronic)       ID    COVID-19 - Primary    Current Assessment & Plan     Paxlovid is not an option due to his medications, treated as upper respiratory infection. He has received his primary vaccination series and one booster. Most likely this will be a mild case, but he is to report to the ER if he has any SOB, and let me know if he has any questions.             Other    ARDEN on CPAP (Chronic)     Other Visit Diagnoses       Generalized body aches        Relevant Orders    POCT SARS-COV2 (COVID) with Flu Antigen (Completed)    Cough, unspecified type        Relevant Orders    POCT SARS-COV2 (COVID) with Flu Antigen (Completed)    Encounter for laboratory testing for COVID-19 virus        Relevant Orders    POCT SARS-COV2 (COVID) with Flu Antigen (Completed)    Upper respiratory tract infection, unspecified type        Relevant Medications    predniSONE (DELTASONE) 20 MG tablet    azithromycin (Z-DEJAH) 250 MG tablet     dexbrompheniramine-phenylep-DM (POLYTUSSIN DM,DEXBROMPHENIRMN,) 2-7.5-15 mg/5 mL Liqd          COVID-19    Generalized body aches  -     POCT SARS-COV2 (COVID) with Flu Antigen    Cough, unspecified type  -     POCT SARS-COV2 (COVID) with Flu Antigen    Encounter for laboratory testing for COVID-19 virus  -     POCT SARS-COV2 (COVID) with Flu Antigen    Upper respiratory tract infection, unspecified type  -     predniSONE (DELTASONE) 20 MG tablet; Take 1 tablet (20 mg total) by mouth once daily. For SINUS  Dispense: 7 tablet; Refill: 0  -     azithromycin (Z-DEJAH) 250 MG tablet; Take 2 tablets by mouth on day 1; Take 1 tablet by mouth on days 2-5  Dispense: 6 tablet; Refill: 0  -     dexbrompheniramine-phenylep-DM (POLYTUSSIN DM,DEXBROMPHENIRMN,) 2-7.5-15 mg/5 mL Liqd; Take 5 mLs by mouth 3 (three) times daily as needed (for COUGH and CONGESTION).  Dispense: 120 mL; Refill: 0    Atrial fibrillation, unspecified type    Primary hypertension    ARDEN on CPAP     Health Maintenance Topics with due status: Not Due       Topic Last Completion Date    Lipid Panel 03/01/2022       Procedures     Future Appointments   Date Time Provider Department Center   11/3/2022  9:30 AM John Victor MD [facility] [place]   3/22/2023 10:00 AM Tera Jones DO [facility] SLEEP [facility] [place]   5/1/2023 10:00 AM AWV NURSE, [facility] FAMILY MEDICINE [facility] FAMMED [facility] [place]   7/12/2023  9:30 AM [facility] US1 [facility] VASC[facility]    7/12/2023 10:00 AM Shanthi Gary MD [facility] [place] [facility]        Follow up if symptoms worsen or fail to improve.     Signature:  John Victor MD  [address] [place] [address]  Phone #: [phone]  Fax #: [phone]    Date of encounter: 10/24/22    There are no Patient Instructions on file for this visit.

## 2022-11-01 PROCEDURE — 90853 GROUP PSYCHOTHERAPY: CPT | Mod: PO

## 2022-11-02 PROCEDURE — 90853 GROUP PSYCHOTHERAPY: CPT | Mod: PO

## 2022-11-07 PROCEDURE — 90853 GROUP PSYCHOTHERAPY: CPT | Mod: PO

## 2022-11-08 PROCEDURE — 90853 GROUP PSYCHOTHERAPY: CPT | Mod: PO

## 2022-11-10 PROCEDURE — 90853 GROUP PSYCHOTHERAPY: CPT | Mod: PO

## 2022-11-16 PROCEDURE — 90853 GROUP PSYCHOTHERAPY: CPT | Mod: PN

## 2022-11-17 PROCEDURE — 90853 GROUP PSYCHOTHERAPY: CPT | Mod: PN

## 2022-11-21 PROCEDURE — 90853 GROUP PSYCHOTHERAPY: CPT | Mod: PN

## 2022-11-22 PROCEDURE — 90853 GROUP PSYCHOTHERAPY: CPT | Mod: PN

## 2022-11-23 PROCEDURE — 90853 GROUP PSYCHOTHERAPY: CPT | Mod: PN

## 2022-11-28 PROCEDURE — 90853 GROUP PSYCHOTHERAPY: CPT | Mod: PN

## 2022-11-29 PROCEDURE — 90853 GROUP PSYCHOTHERAPY: CPT | Mod: PN

## 2022-12-01 PROCEDURE — 90853 GROUP PSYCHOTHERAPY: CPT | Mod: PN

## 2022-12-06 PROCEDURE — 90853 GROUP PSYCHOTHERAPY: CPT | Mod: PN

## 2022-12-07 PROCEDURE — 90853 GROUP PSYCHOTHERAPY: CPT | Mod: PN

## 2022-12-08 PROCEDURE — 90853 GROUP PSYCHOTHERAPY: CPT | Mod: PN

## 2022-12-12 PROCEDURE — 90853 GROUP PSYCHOTHERAPY: CPT | Mod: PN

## 2022-12-13 PROCEDURE — 90853 GROUP PSYCHOTHERAPY: CPT | Mod: PN

## 2022-12-15 DIAGNOSIS — R05.1 ACUTE COUGH: Primary | ICD-10-CM

## 2022-12-15 PROCEDURE — 90853 GROUP PSYCHOTHERAPY: CPT | Mod: PN

## 2022-12-15 RX ORDER — BENZONATATE 100 MG/1
100 CAPSULE ORAL 2 TIMES DAILY
Qty: 60 CAPSULE | Refills: 0 | Status: SHIPPED | OUTPATIENT
Start: 2022-12-15 | End: 2023-01-10

## 2022-12-15 RX ORDER — BENZONATATE 100 MG/1
100 CAPSULE ORAL 2 TIMES DAILY
COMMUNITY
End: 2022-12-15 | Stop reason: SDUPTHER

## 2022-12-15 NOTE — TELEPHONE ENCOUNTER
The cough has been going on since he had covid and hasnt gone away yet. He also stated that he had some pulmonary fibrosis in one lung.

## 2022-12-15 NOTE — TELEPHONE ENCOUNTER
Pt's daughter left a voicemail stating that the pt still had a dry hacking cough. She was wanting to know if you would recommend him taking the Tessalon Perles instead of Delsussin Cough Syrup and if you would be willing to call him in some. Please advise.

## 2022-12-20 PROCEDURE — 90853 GROUP PSYCHOTHERAPY: CPT | Mod: PN

## 2022-12-21 PROCEDURE — 90853 GROUP PSYCHOTHERAPY: CPT | Mod: PN

## 2022-12-22 PROCEDURE — 90853 GROUP PSYCHOTHERAPY: CPT | Mod: PN

## 2022-12-27 PROCEDURE — 90853 GROUP PSYCHOTHERAPY: CPT | Mod: PN

## 2022-12-29 PROCEDURE — 90853 GROUP PSYCHOTHERAPY: CPT | Mod: PN

## 2023-01-04 ENCOUNTER — PATIENT MESSAGE (OUTPATIENT)
Dept: FAMILY MEDICINE | Facility: CLINIC | Age: 83
End: 2023-01-04
Payer: MEDICARE

## 2023-01-05 ENCOUNTER — HOSPITAL ENCOUNTER (OUTPATIENT)
Dept: RADIOLOGY | Facility: HOSPITAL | Age: 83
Discharge: HOME | End: 2023-01-05
Attending: FAMILY MEDICINE
Payer: MEDICARE

## 2023-01-05 ENCOUNTER — OFFICE VISIT (OUTPATIENT)
Dept: FAMILY MEDICINE | Facility: CLINIC | Age: 83
End: 2023-01-05
Payer: MEDICARE

## 2023-01-05 VITALS
TEMPERATURE: 98 F | HEART RATE: 73 BPM | RESPIRATION RATE: 18 BRPM | WEIGHT: 198.81 LBS | OXYGEN SATURATION: 96 % | BODY MASS INDEX: 28.52 KG/M2 | DIASTOLIC BLOOD PRESSURE: 78 MMHG | SYSTOLIC BLOOD PRESSURE: 130 MMHG

## 2023-01-05 DIAGNOSIS — Z20.822 EXPOSURE TO COVID-19 VIRUS: ICD-10-CM

## 2023-01-05 DIAGNOSIS — R06.02 SHORTNESS OF BREATH: ICD-10-CM

## 2023-01-05 DIAGNOSIS — J84.10 PULMONARY FIBROSIS: ICD-10-CM

## 2023-01-05 DIAGNOSIS — J06.9 UPPER RESPIRATORY TRACT INFECTION, UNSPECIFIED TYPE: Primary | ICD-10-CM

## 2023-01-05 LAB
CTP QC/QA: YES
FLUAV AG NPH QL: NEGATIVE
FLUBV AG NPH QL: NEGATIVE
SARS-COV-2 AG RESP QL IA.RAPID: NEGATIVE

## 2023-01-05 PROCEDURE — 71046 X-RAY EXAM CHEST 2 VIEWS: CPT | Mod: TC,PN

## 2023-01-05 PROCEDURE — 96372 PR INJECTION,THERAP/PROPH/DIAG2ST, IM OR SUBCUT: ICD-10-PCS | Mod: ,,, | Performed by: FAMILY MEDICINE

## 2023-01-05 PROCEDURE — 96372 THER/PROPH/DIAG INJ SC/IM: CPT | Mod: ,,, | Performed by: FAMILY MEDICINE

## 2023-01-05 PROCEDURE — 99213 PR OFFICE/OUTPT VISIT, EST, LEVL III, 20-29 MIN: ICD-10-PCS | Mod: ,,, | Performed by: FAMILY MEDICINE

## 2023-01-05 PROCEDURE — 99213 OFFICE O/P EST LOW 20 MIN: CPT | Mod: ,,, | Performed by: FAMILY MEDICINE

## 2023-01-05 PROCEDURE — 87428 SARSCOV & INF VIR A&B AG IA: CPT | Mod: RHCUB | Performed by: FAMILY MEDICINE

## 2023-01-05 RX ORDER — DEXAMETHASONE SODIUM PHOSPHATE 4 MG/ML
4 INJECTION, SOLUTION INTRA-ARTICULAR; INTRALESIONAL; INTRAMUSCULAR; INTRAVENOUS; SOFT TISSUE
Status: COMPLETED | OUTPATIENT
Start: 2023-01-05 | End: 2023-01-05

## 2023-01-05 RX ORDER — METHYLPREDNISOLONE ACETATE 40 MG/ML
40 INJECTION, SUSPENSION INTRA-ARTICULAR; INTRALESIONAL; INTRAMUSCULAR; SOFT TISSUE
Status: COMPLETED | OUTPATIENT
Start: 2023-01-05 | End: 2023-01-05

## 2023-01-05 RX ORDER — ALBUTEROL SULFATE 90 UG/1
2 AEROSOL, METERED RESPIRATORY (INHALATION) EVERY 6 HOURS PRN
Qty: 18 G | Refills: 1 | Status: SHIPPED | OUTPATIENT
Start: 2023-01-05 | End: 2023-05-01

## 2023-01-05 RX ORDER — AZITHROMYCIN 250 MG/1
TABLET, FILM COATED ORAL
Qty: 6 TABLET | Refills: 0 | Status: SHIPPED | OUTPATIENT
Start: 2023-01-05 | End: 2023-01-10

## 2023-01-05 RX ADMIN — DEXAMETHASONE SODIUM PHOSPHATE 4 MG: 4 INJECTION, SOLUTION INTRA-ARTICULAR; INTRALESIONAL; INTRAMUSCULAR; INTRAVENOUS; SOFT TISSUE at 10:01

## 2023-01-05 RX ADMIN — METHYLPREDNISOLONE ACETATE 40 MG: 40 INJECTION, SUSPENSION INTRA-ARTICULAR; INTRALESIONAL; INTRAMUSCULAR; SOFT TISSUE at 10:01

## 2023-01-10 ENCOUNTER — OFFICE VISIT (OUTPATIENT)
Dept: FAMILY MEDICINE | Facility: CLINIC | Age: 83
End: 2023-01-10
Payer: MEDICARE

## 2023-01-10 VITALS
WEIGHT: 195.63 LBS | OXYGEN SATURATION: 97 % | HEART RATE: 58 BPM | BODY MASS INDEX: 28.01 KG/M2 | DIASTOLIC BLOOD PRESSURE: 70 MMHG | SYSTOLIC BLOOD PRESSURE: 120 MMHG | HEIGHT: 70 IN

## 2023-01-10 DIAGNOSIS — J84.10 PULMONARY FIBROSIS: Chronic | ICD-10-CM

## 2023-01-10 DIAGNOSIS — J22 LOWER RESPIRATORY TRACT INFECTION: Primary | ICD-10-CM

## 2023-01-10 PROBLEM — N40.0 BENIGN PROSTATIC HYPERPLASIA: Status: ACTIVE | Noted: 2023-01-10

## 2023-01-10 PROBLEM — C91.10 CHRONIC LYMPHOCYTIC LEUKEMIA: Status: ACTIVE | Noted: 2023-01-10

## 2023-01-10 PROCEDURE — 99213 OFFICE O/P EST LOW 20 MIN: CPT | Mod: ,,, | Performed by: FAMILY MEDICINE

## 2023-01-10 PROCEDURE — 99213 PR OFFICE/OUTPT VISIT, EST, LEVL III, 20-29 MIN: ICD-10-PCS | Mod: ,,, | Performed by: FAMILY MEDICINE

## 2023-01-10 RX ORDER — SPIRONOLACTONE 25 MG/1
25 TABLET ORAL DAILY
COMMUNITY
Start: 2022-11-17

## 2023-01-10 RX ORDER — PREDNISONE 10 MG/1
TABLET ORAL
Qty: 30 TABLET | Refills: 0 | Status: SHIPPED | OUTPATIENT
Start: 2023-01-10 | End: 2023-01-25

## 2023-01-10 NOTE — PROGRESS NOTES
John Victor MD    [address] [place] [address]     PATIENT NAME: Vaibhav Tong  : 1940  DATE: 1/10/23  MRN: [id number]      Billing Provider: John Victor MD  Level of Service: ME OFFICE/OUTPT VISIT, EST, LEVL III, 20-29 MIN  Patient PCP Information       Provider PCP Type    John Victor MD General            Reason for Visit / Chief Complaint: Nasal Congestion (Pt c/o cough and congestion. He saw Dr. Howell last week for a sinus infection and got a shot and antibiotics that helped for a few days. He now thinks it is coming back on him. )       Update PCP  Update Chief Complaint         History of Present Illness / Problem Focused Workflow     Vaibhav Tong presents to the clinic with Nasal Congestion (Pt c/o cough and congestion. He saw Dr. Howell last week for a sinus infection and got a shot and antibiotics that helped for a few days. He now thinks it is coming back on him. )     He states that his sinuses flare up in the evening more, and then his cough gets real bad, appreciates some wheezing sounds, he will have coughing fits at times, no fever.     He had a bronchial wash about 2-3 months ago by Pulmonology at [facility]    Review of Systems     Review of Systems   Constitutional:  Negative for activity change, appetite change, chills, fatigue and fever.   HENT:  Positive for nasal congestion. Negative for ear pain, hearing loss, postnasal drip and sore throat.    Respiratory:  Positive for cough. Negative for chest tightness, shortness of breath and wheezing.    Cardiovascular:  Negative for chest pain, palpitations, leg swelling and claudication.   Gastrointestinal:  Negative for abdominal pain, change in bowel habit, constipation, diarrhea, nausea, vomiting and change in bowel habit.   Genitourinary:  Negative for dysuria.   Musculoskeletal:  Negative for arthralgias, back pain, gait problem and myalgias.    Integumentary:  Negative for rash.   Neurological:  Negative for weakness and headaches.   Psychiatric/Behavioral:  Negative for suicidal ideas. The patient is not nervous/anxious.       Medical / Social / Family History     Past Medical History:   Diagnosis Date    Atrial fibrillation     Hyperlipidemia     Hypertension        Past Surgical History:   Procedure Laterality Date    abaltion      APPENDECTOMY         Social History    reports that he has never smoked. He has never used smokeless tobacco. He reports that he does not drink alcohol and does not use drugs.    Family History  's family history includes Heart disease in his brother and sister; Hypertension in his mother and sister; No Known Problems in his father.    Medications and Allergies     Medications  Outpatient Medications Marked as Taking for the 1/10/23 encounter (Office Visit) with John Victor MD   Medication Sig Dispense Refill    aspirin (ECOTRIN) 81 MG EC tablet Take 81 mg by mouth.      atorvastatin (LIPITOR) 40 MG tablet Take 1 tablet (40 mg total) by mouth nightly. 90 tablet 1    clopidogreL (PLAVIX) 75 mg tablet Take 75 mg by mouth once daily.      dofetilide (TIKOSYN) 250 MCG Cap Take 1 capsule by mouth 2 (two) times a day.      finasteride (PROSCAR) 5 mg tablet Take 5 mg by mouth nightly.       hydrALAZINE (APRESOLINE) 25 MG tablet Take 2 tablets (50 mg total) by mouth every 12 (twelve) hours. For BLOOD PRESSURE 360 tablet 1    irbesartan (AVAPRO) 300 MG tablet Take 150 mg by mouth once daily.      metoprolol succinate (TOPROL-XL) 25 MG 24 hr tablet Take 25 mg by mouth 2 (two) times daily.      multivitamin (THERAGRAN) per tablet Take 1 tablet by mouth once daily.      pantoprazole (PROTONIX) 40 MG tablet Take 1 tablet (40 mg total) by mouth every morning. For REFLUX 90 tablet 1    spironolactone (ALDACTONE) 25 MG tablet Take 25 mg by mouth once daily.      tamsulosin (FLOMAX) 0.4 mg Cap Take by mouth once daily.          Allergies  Review of patient's allergies indicates:   Allergen Reactions    Ace inhibitors     Codeine        Physical Examination     Vitals:    01/10/23 1052   BP: 120/70   Pulse: (!) 58     Physical Exam  Vitals and nursing note reviewed.   Constitutional:       General: He is not in acute distress.     Appearance: Normal appearance. He is not ill-appearing.   Eyes:      Extraocular Movements: Extraocular movements intact.      Pupils: Pupils are equal, round, and reactive to light.   Cardiovascular:      Rate and Rhythm: Normal rate and regular rhythm.      Heart sounds: Normal heart sounds.   Pulmonary:      Effort: Pulmonary effort is normal.      Breath sounds: Examination of the right-middle field reveals rhonchi. Examination of the left-middle field reveals rhonchi. Rhonchi present.   Abdominal:      General: Bowel sounds are normal.      Palpations: Abdomen is soft.   Musculoskeletal:         General: Normal range of motion.   Skin:     Findings: No rash.   Neurological:      General: No focal deficit present.      Mental Status: He is alert and oriented to person, place, and time. Mental status is at baseline.   Psychiatric:         Mood and Affect: Mood normal.         Behavior: Behavior normal.          Assessment and Plan (including Health Maintenance)      Problem List  Smart Sets  Document Outside HM   :    Plan:         Health Maintenance Due   Topic Date Due    Influenza Vaccine (1) 09/01/2022       Problem List Items Addressed This Visit          Pulmonary    Pulmonary fibrosis (Chronic)     Other Visit Diagnoses       Lower respiratory tract infection    -  Primary          Lower respiratory tract infection    Pulmonary fibrosis    Other orders  -     predniSONE (DELTASONE) 10 MG tablet; Take 3 tablets (30 mg total) by mouth once daily for 5 days, THEN 2 tablets (20 mg total) once daily for 5 days, THEN 1 tablet (10 mg total) once daily for 5 days.  Dispense: 30 tablet; Refill: 0        Health Maintenance Topics with due status: Not Due       Topic Last Completion Date    Lipid Panel 03/01/2022    Aspirin/Antiplatelet Therapy 01/10/2023       Procedures     Future Appointments   Date Time Provider Department Center   3/22/2023 10:00 AM Tera Jones DO [facility] SLEEP [place]   5/1/2023 10:00 AM AWV NURSE, [facility] FAMILY MEDICINE [facility] FAMMED [place]   7/12/2023  9:30 AM [facility] [address] [place] [address] VASCUS [facility]   7/12/2023 10:00 AM [facility]        No follow-ups on file.     Signature:  John Victor MD  [address] [place] [address]  Phone #: [phone]  Fax #: [phone]    Date of encounter: 1/10/23    There are no Patient Instructions on file for this visit.

## 2023-01-13 ENCOUNTER — TELEPHONE (OUTPATIENT)
Dept: FAMILY MEDICINE | Facility: CLINIC | Age: 83
End: 2023-01-13
Payer: MEDICARE

## 2023-01-13 DIAGNOSIS — J84.10 PULMONARY FIBROSIS: Primary | Chronic | ICD-10-CM

## 2023-01-13 DIAGNOSIS — J06.9 UPPER RESPIRATORY TRACT INFECTION, UNSPECIFIED TYPE: ICD-10-CM

## 2023-01-13 RX ORDER — ALBUTEROL SULFATE 0.83 MG/ML
2.5 SOLUTION RESPIRATORY (INHALATION) EVERY 6 HOURS PRN
Qty: 300 ML | Refills: 2 | Status: SHIPPED | OUTPATIENT
Start: 2023-01-13 | End: 2023-05-01

## 2023-01-13 NOTE — TELEPHONE ENCOUNTER
Discussed lid hygiene, warm compress and eyelid wash. Pt states he does not need a nebulizer or nebulizer solution.

## 2023-01-13 NOTE — TELEPHONE ENCOUNTER
Pt daughter called and requested a nebulizer and albuterol solution for the pt. She is wanting to knwo if it can be called in today if you advise it. He has gotten worse since yesterday she said. Please advise.

## 2023-01-15 NOTE — PROGRESS NOTES
New Clinic Note    Vaibhav Tong is a 82 y.o. male     CC:   Chief Complaint   Patient presents with    Cough     Patient state he has been coughing 2 days. Stated he has been a dry cough. Thought it just was a head cold and things got worse.     Nasal Congestion     Stated he's been congested bad for 2 days. Patient state daughter swab him Tuesday and he was negative for covid. Denied wanting to be swab again stated he had covid before and know what it feel like doesn't think he has covid.     Shortness of Breath     Patient state he's sob and have a history of pneumonia. Want an xray done         Subjective    History of Present Illness HPI   Patient complains of cough and congestion for 2 days. He had a negative COVID test at home 2 days ago. He complains of shortness of breath. He denies fever. He has taken OTC meds with some relief.     Current Outpatient Medications:     aspirin (ECOTRIN) 81 MG EC tablet, Take 81 mg by mouth., Disp: , Rfl:     atorvastatin (LIPITOR) 40 MG tablet, Take 1 tablet (40 mg total) by mouth nightly., Disp: 90 tablet, Rfl: 1    clopidogreL (PLAVIX) 75 mg tablet, Take 75 mg by mouth once daily., Disp: , Rfl:     dofetilide (TIKOSYN) 250 MCG Cap, Take 1 capsule by mouth 2 (two) times a day., Disp: , Rfl:     finasteride (PROSCAR) 5 mg tablet, Take 5 mg by mouth nightly. , Disp: , Rfl:     hydrALAZINE (APRESOLINE) 25 MG tablet, Take 2 tablets (50 mg total) by mouth every 12 (twelve) hours. For BLOOD PRESSURE, Disp: 360 tablet, Rfl: 1    irbesartan (AVAPRO) 300 MG tablet, Take 150 mg by mouth once daily., Disp: , Rfl:     metoprolol succinate (TOPROL-XL) 25 MG 24 hr tablet, Take 25 mg by mouth 2 (two) times daily., Disp: , Rfl:     multivitamin (THERAGRAN) per tablet, Take 1 tablet by mouth once daily., Disp: , Rfl:     pantoprazole (PROTONIX) 40 MG tablet, Take 1 tablet (40 mg total) by mouth every morning. For REFLUX, Disp: 90 tablet, Rfl: 1    tamsulosin (FLOMAX) 0.4 mg Cap, Take by mouth  once daily., Disp: , Rfl:     albuterol (PROVENTIL) 2.5 mg /3 mL (0.083 %) nebulizer solution, Take 3 mLs (2.5 mg total) by nebulization every 6 (six) hours as needed for Wheezing. Rescue, Disp: 300 mL, Rfl: 2    albuterol (VENTOLIN HFA) 90 mcg/actuation inhaler, Inhale 2 puffs into the lungs every 6 (six) hours as needed for Wheezing. Rescue (Patient not taking: Reported on 1/10/2023), Disp: 18 g, Rfl: 1    predniSONE (DELTASONE) 10 MG tablet, Take 3 tablets (30 mg total) by mouth once daily for 5 days, THEN 2 tablets (20 mg total) once daily for 5 days, THEN 1 tablet (10 mg total) once daily for 5 days., Disp: 30 tablet, Rfl: 0    spironolactone (ALDACTONE) 25 MG tablet, Take 25 mg by mouth once daily., Disp: , Rfl:      Past Medical History:   Diagnosis Date    Atrial fibrillation     Hyperlipidemia     Hypertension         Family History   Problem Relation Age of Onset    Hypertension Mother     No Known Problems Father     Heart disease Brother     Heart disease Sister     Hypertension Sister         Past Surgical History:   Procedure Laterality Date    abaltion      APPENDECTOMY          Review of Systems   Constitutional:  Negative for fatigue and fever.   HENT:  Positive for nasal congestion, postnasal drip, rhinorrhea and sinus pressure/congestion. Negative for ear pain.    Respiratory:  Positive for cough and shortness of breath.    Cardiovascular:  Negative for chest pain.   Gastrointestinal:  Negative for abdominal pain, diarrhea, nausea and vomiting.   Genitourinary:  Negative for dysuria.   Neurological:  Negative for headaches.      /78 (BP Location: Left arm, Patient Position: Sitting, BP Method: Large (Manual))   Pulse 73   Temp 98 °F (36.7 °C) (Oral)   Resp 18   Wt 90.2 kg (198 lb 12.8 oz)   SpO2 96%   BMI 28.52 kg/m²      Physical Exam  HENT:      Head: Normocephalic and atraumatic.      Nose: Rhinorrhea present.      Mouth/Throat:      Pharynx: Oropharynx is clear.   Cardiovascular:       Rate and Rhythm: Normal rate and regular rhythm.   Pulmonary:      Effort: Pulmonary effort is normal.      Breath sounds: Normal breath sounds.   Neurological:      Mental Status: He is alert and oriented to person, place, and time.   Psychiatric:         Mood and Affect: Mood normal.         Behavior: Behavior normal.        Assessment and Plan      ICD-10-CM ICD-9-CM   1. Upper respiratory tract infection, unspecified type  J06.9 465.9   2. Shortness of breath  R06.02 786.05   3. Exposure to COVID-19 virus  Z20.822 V01.79   4. Pulmonary fibrosis  J84.10 515        Problem List Items Addressed This Visit          Pulmonary    Pulmonary fibrosis (Chronic)     Other Visit Diagnoses       Upper respiratory tract infection, unspecified type    -  Primary    Relevant Medications    methylPREDNISolone acetate injection 40 mg (Completed)    dexAMETHasone injection 4 mg (Completed)    albuterol (VENTOLIN HFA) 90 mcg/actuation inhaler    Shortness of breath        Relevant Orders    X-Ray Chest PA And Lateral (Completed)    Exposure to COVID-19 virus        Relevant Orders    POCT SARS-COV2 (COVID) with Flu Antigen (Completed)             Patient Instructions   Take medications as directed  Monitor temperature, if fever begins, tylenol or ibuprofen can be used as long as you have no history of abnormal reactions to these medications. Follow the instructions on the bottle or contact clinic with questions.   Please contact clinic if symptoms begin to get worse.   Report to the ER if you feel your symptoms are severe and life threatening.       Follow up if symptoms worsen or fail to improve.

## 2023-01-17 PROCEDURE — 90853 GROUP PSYCHOTHERAPY: CPT | Mod: PN

## 2023-01-19 PROCEDURE — 90853 GROUP PSYCHOTHERAPY: CPT | Mod: PN

## 2023-01-24 PROCEDURE — 90853 GROUP PSYCHOTHERAPY: CPT | Mod: PN

## 2023-01-25 PROCEDURE — 88305 TISSUE EXAM BY PATHOLOGIST: CPT | Mod: TC,SUR

## 2023-01-25 PROCEDURE — 88305 PATHOLOGY, DERMATOLOGY: ICD-10-PCS | Mod: 26,,, | Performed by: PATHOLOGY

## 2023-01-25 PROCEDURE — 88305 TISSUE EXAM BY PATHOLOGIST: CPT | Mod: 26,,, | Performed by: PATHOLOGY

## 2023-01-26 ENCOUNTER — LAB REQUISITION (OUTPATIENT)
Dept: LAB | Facility: HOSPITAL | Age: 83
End: 2023-01-26
Payer: MEDICARE

## 2023-01-26 DIAGNOSIS — D49.2 NEOPLASM OF UNSPECIFIED BEHAVIOR OF BONE, SOFT TISSUE, AND SKIN: ICD-10-CM

## 2023-01-26 PROCEDURE — 90853 GROUP PSYCHOTHERAPY: CPT | Mod: PN

## 2023-01-27 LAB
ESTROGEN SERPL-MCNC: NORMAL PG/ML
INSULIN SERPL-ACNC: NORMAL U[IU]/ML
LAB AP GROSS DESCRIPTION: NORMAL
LAB AP LABORATORY NOTES: NORMAL
LAB AP SPEC A DDX: NORMAL
LAB AP SPEC A MORPHOLOGY: NORMAL
LAB AP SPEC A PROCEDURE: NORMAL
T3RU NFR SERPL: NORMAL %

## 2023-02-01 PROCEDURE — 90853 GROUP PSYCHOTHERAPY: CPT | Mod: PN

## 2023-02-02 PROCEDURE — 90853 GROUP PSYCHOTHERAPY: CPT | Mod: PN

## 2023-02-08 PROCEDURE — 90853 GROUP PSYCHOTHERAPY: CPT | Mod: PN

## 2023-02-09 PROCEDURE — 90853 GROUP PSYCHOTHERAPY: CPT | Mod: PN

## 2023-02-14 PROCEDURE — 90853 GROUP PSYCHOTHERAPY: CPT | Mod: PN

## 2023-02-16 PROCEDURE — 90853 GROUP PSYCHOTHERAPY: CPT | Mod: PN

## 2023-02-21 PROCEDURE — 90853 GROUP PSYCHOTHERAPY: CPT | Mod: PN

## 2023-02-23 PROCEDURE — 90853 GROUP PSYCHOTHERAPY: CPT | Mod: PN

## 2023-02-27 DIAGNOSIS — E78.2 MIXED HYPERLIPIDEMIA: Chronic | ICD-10-CM

## 2023-02-27 PROCEDURE — 90853 GROUP PSYCHOTHERAPY: CPT | Mod: PN

## 2023-02-27 RX ORDER — ATORVASTATIN CALCIUM 40 MG/1
40 TABLET, FILM COATED ORAL NIGHTLY
Qty: 90 TABLET | Refills: 1 | Status: SHIPPED | OUTPATIENT
Start: 2023-02-27 | End: 2023-08-28 | Stop reason: SDUPTHER

## 2023-02-27 NOTE — TELEPHONE ENCOUNTER
----- Message from Jina Enriquez sent at 2/27/2023  1:13 PM CST -----  Please send for refill of pts atorvastatin (LIPITOR) 40 MG tablet to [facility]

## 2023-03-01 PROCEDURE — 90853 GROUP PSYCHOTHERAPY: CPT | Mod: PN

## 2023-03-07 PROCEDURE — 90853 GROUP PSYCHOTHERAPY: CPT | Mod: PN

## 2023-03-09 PROCEDURE — 90853 GROUP PSYCHOTHERAPY: CPT | Mod: PN

## 2023-03-14 PROCEDURE — 90853 GROUP PSYCHOTHERAPY: CPT | Mod: PN

## 2023-03-21 PROCEDURE — 90853 GROUP PSYCHOTHERAPY: CPT | Mod: PN

## 2023-03-28 PROCEDURE — 90853 GROUP PSYCHOTHERAPY: CPT | Mod: PN

## 2023-04-04 PROCEDURE — 90853 GROUP PSYCHOTHERAPY: CPT | Mod: PN

## 2023-04-09 DIAGNOSIS — Z71.89 COMPLEX CARE COORDINATION: ICD-10-CM

## 2023-04-11 PROCEDURE — 90853 GROUP PSYCHOTHERAPY: CPT | Mod: PN

## 2023-04-18 PROCEDURE — 90853 GROUP PSYCHOTHERAPY: CPT | Mod: PN

## 2023-04-25 ENCOUNTER — TELEPHONE (OUTPATIENT)
Dept: SLEEP MEDICINE | Facility: CLINIC | Age: 83
End: 2023-04-25
Payer: MEDICARE

## 2023-04-25 PROCEDURE — 90853 GROUP PSYCHOTHERAPY: CPT | Mod: PN

## 2023-04-26 ENCOUNTER — OFFICE VISIT (OUTPATIENT)
Dept: SLEEP MEDICINE | Facility: CLINIC | Age: 83
End: 2023-04-26
Attending: FAMILY MEDICINE
Payer: MEDICARE

## 2023-04-26 VITALS
WEIGHT: 194.38 LBS | OXYGEN SATURATION: 98 % | DIASTOLIC BLOOD PRESSURE: 62 MMHG | SYSTOLIC BLOOD PRESSURE: 123 MMHG | HEART RATE: 59 BPM | BODY MASS INDEX: 27.83 KG/M2 | HEIGHT: 70 IN

## 2023-04-26 DIAGNOSIS — G47.33 OSA ON CPAP: Primary | Chronic | ICD-10-CM

## 2023-04-26 DIAGNOSIS — I10 PRIMARY HYPERTENSION: Chronic | ICD-10-CM

## 2023-04-26 DIAGNOSIS — I48.91 ATRIAL FIBRILLATION, UNSPECIFIED TYPE: Chronic | ICD-10-CM

## 2023-04-26 PROCEDURE — 99212 PR OFFICE/OUTPT VISIT, EST, LEVL II, 10-19 MIN: ICD-10-PCS | Mod: S$PBB,,, | Performed by: FAMILY MEDICINE

## 2023-04-26 PROCEDURE — 99214 OFFICE O/P EST MOD 30 MIN: CPT | Mod: PBBFAC,PN | Performed by: FAMILY MEDICINE

## 2023-04-26 PROCEDURE — 99212 OFFICE O/P EST SF 10 MIN: CPT | Mod: S$PBB,,, | Performed by: FAMILY MEDICINE

## 2023-04-26 NOTE — PROGRESS NOTES
Patient presents to clinic for CPAP F/U. ESS is 11. Patient gets supplies from [facility] in [place]. Patient wears a full face mask. Patient has received his new machine.

## 2023-04-26 NOTE — PROGRESS NOTES
Subjective     Patient ID: Vaibhav Tong is a 83 y.o. male.    Chief Complaint: Sleep Apnea (CPAP management)    Patient follows up in Sleep Clinic for CPAP management denying any problems with his CPAP machine or mask.  Compliance is 100% with an average AHI of 0.5 at a pressure of 10 cm H2O.  Patient's [place] score is 11 and I have instructed the patient to try to increase hours of sleep to 8 hours.  The patient is using and benefitting from CPAP.  Patient's initial AHI was 22.1 with a low O2 saturation of 75%.  The patient uses a fullface mask.    Review of Systems   Constitutional:  Negative for fatigue.        Negative EDS   Respiratory:  Negative for apnea.    Psychiatric/Behavioral:  Negative for sleep disturbance.         Objective     Physical Exam  Cardiovascular:      Rate and Rhythm: Normal rate and regular rhythm.      Heart sounds: No murmur heard.  Pulmonary:      Breath sounds: Normal breath sounds.   Skin:     General: Skin is warm and dry.   Neurological:      Mental Status: He is alert and oriented to person, place, and time.          Assessment and Plan     Problem List Items Addressed This Visit          Cardiac/Vascular    Hypertension (Chronic)    Atrial fibrillation (Chronic)       Other    ARDEN on CPAP - Primary (Chronic)       Continue CPAP @ 10 cm H20  Caution while operating a motor vehicle  Prescription for new supplies  Follow up sleep clinic in 1 year.

## 2023-05-01 ENCOUNTER — OFFICE VISIT (OUTPATIENT)
Dept: FAMILY MEDICINE | Facility: CLINIC | Age: 83
End: 2023-05-01
Payer: MEDICARE

## 2023-05-01 VITALS
BODY MASS INDEX: 27.66 KG/M2 | WEIGHT: 193.19 LBS | OXYGEN SATURATION: 97 % | DIASTOLIC BLOOD PRESSURE: 60 MMHG | TEMPERATURE: 98 F | HEART RATE: 57 BPM | HEIGHT: 70 IN | SYSTOLIC BLOOD PRESSURE: 120 MMHG | RESPIRATION RATE: 18 BRPM

## 2023-05-01 DIAGNOSIS — G47.33 OSA ON CPAP: Chronic | ICD-10-CM

## 2023-05-01 DIAGNOSIS — N40.1 BENIGN PROSTATIC HYPERPLASIA WITH URINARY FREQUENCY: Chronic | ICD-10-CM

## 2023-05-01 DIAGNOSIS — C91.10 CHRONIC LYMPHOCYTIC LEUKEMIA: Chronic | ICD-10-CM

## 2023-05-01 DIAGNOSIS — I10 PRIMARY HYPERTENSION: Chronic | ICD-10-CM

## 2023-05-01 DIAGNOSIS — Z00.00 ENCOUNTER FOR SUBSEQUENT ANNUAL WELLNESS VISIT (AWV) IN MEDICARE PATIENT: Primary | ICD-10-CM

## 2023-05-01 DIAGNOSIS — J84.10 PULMONARY FIBROSIS: Chronic | ICD-10-CM

## 2023-05-01 DIAGNOSIS — K21.9 GASTROESOPHAGEAL REFLUX DISEASE WITHOUT ESOPHAGITIS: Chronic | ICD-10-CM

## 2023-05-01 DIAGNOSIS — I70.0 AORTIC ATHEROSCLEROSIS: Chronic | ICD-10-CM

## 2023-05-01 DIAGNOSIS — E78.2 MIXED HYPERLIPIDEMIA: Chronic | ICD-10-CM

## 2023-05-01 DIAGNOSIS — R35.0 BENIGN PROSTATIC HYPERPLASIA WITH URINARY FREQUENCY: Chronic | ICD-10-CM

## 2023-05-01 DIAGNOSIS — I48.91 ATRIAL FIBRILLATION, UNSPECIFIED TYPE: Chronic | ICD-10-CM

## 2023-05-01 PROBLEM — N40.0 BENIGN PROSTATIC HYPERPLASIA: Chronic | Status: ACTIVE | Noted: 2023-01-10

## 2023-05-01 LAB
CHOLEST SERPL-MCNC: 121 MG/DL (ref 0–200)
CHOLEST/HDLC SERPL: 3.5 {RATIO}
HDLC SERPL-MCNC: 35 MG/DL (ref 40–60)
LDLC SERPL CALC-MCNC: 47 MG/DL
LDLC/HDLC SERPL: 1.3 {RATIO}
NONHDLC SERPL-MCNC: 86 MG/DL
TRIGL SERPL-MCNC: 196 MG/DL (ref 35–150)
VLDLC SERPL-MCNC: 39 MG/DL

## 2023-05-01 PROCEDURE — 80061 LIPID PANEL: ICD-10-PCS | Mod: ,,, | Performed by: CLINICAL MEDICAL LABORATORY

## 2023-05-01 PROCEDURE — G0439 PR MEDICARE ANNUAL WELLNESS SUBSEQUENT VISIT: ICD-10-PCS | Mod: ,,, | Performed by: FAMILY MEDICINE

## 2023-05-01 PROCEDURE — G0439 PPPS, SUBSEQ VISIT: HCPCS | Mod: ,,, | Performed by: FAMILY MEDICINE

## 2023-05-01 PROCEDURE — 80061 LIPID PANEL: CPT | Mod: ,,, | Performed by: CLINICAL MEDICAL LABORATORY

## 2023-05-01 NOTE — PATIENT INSTRUCTIONS
Counseling and Referral of Other Preventative  (Italic type indicates deductible and co-insurance are waived)    Patient Name: Vaibhav Tong  Today's Date: 5/1/2023    Health Maintenance         Date Due Completion Date    PROSTATE-SPECIFIC ANTIGEN Never done ---    TETANUS VACCINE Never done ---    Abdominal Aortic Aneurysm Screening Never done ---    Shingles Vaccine (1 of 2) 03/30/2012 2/3/2012    Lipid Panel 03/01/2023 3/1/2022    COVID-19 Vaccine (4 - Booster for Moderna series) 01/10/2024 (Originally 12/28/2021) 11/2/2021    Eye Exam 07/18/2023 7/18/2022    Influenza Vaccine (Season Ended) 09/01/2023 11/16/2021    Override on 10/20/2021: Done    Aspirin/Antiplatelet Therapy 04/26/2024 4/26/2023          Orders Placed This Encounter   Procedures    Lipid Panel

## 2023-05-01 NOTE — PROGRESS NOTES
"[facility]      PATIENT NAME: Vaibhav Tong   : 1940    AGE: 83 y.o. DATE: 2023   MRN: [id number]        Reason for Visit / Chief Complaint: Medicare AWV (Subsequent Medicare AWV )        Vaibhav Tong presents for a Subsequent Medicare AWV today.     The following components were reviewed and updated:    Medical/Social/Family History:  Past Medical History:   Diagnosis Date    Atrial fibrillation     BPH (benign prostatic hyperplasia)     Chronic lymphocytic leukemia     GERD (gastroesophageal reflux disease)     Hyperlipidemia     Hypertension     Myocardial infarction     Pulmonary fibrosis     Sleep apnea, unspecified         Family History   Problem Relation Age of Onset    Hypertension Mother     No Known Problems Father     Heart disease Brother     Heart disease Sister     Hypertension Sister         Social History     Tobacco Use   Smoking Status Former    Packs/day: 1.00    Years: 12.00    Pack years: 12.00    Types: Cigarettes    Start date:     Quit date:     Years since quittin.3    Passive exposure: Past   Smokeless Tobacco Never      Social History     Substance and Sexual Activity   Alcohol Use Never       Family History   Problem Relation Age of Onset    Hypertension Mother     No Known Problems Father     Heart disease Brother     Heart disease Sister     Hypertension Sister        Past Surgical History:   Procedure Laterality Date    abaltion      APPENDECTOMY           Allergies and Current Medications     Review of patient's allergies indicates:   Allergen Reactions    Ace inhibitors Other (See Comments)     cough    Albuterol Other (See Comments)     "Extremely light headed and dizzy"    Codeine Other (See Comments)     "Makes me feel crazy and keeps me awake."       Current Outpatient Medications:     aspirin (ECOTRIN) 81 MG EC tablet, Take 81 mg by mouth., Disp: , Rfl:     atorvastatin (LIPITOR) 40 MG tablet, Take 1 tablet (40 " mg total) by mouth nightly., Disp: 90 tablet, Rfl: 1    clopidogreL (PLAVIX) 75 mg tablet, Take 75 mg by mouth once daily., Disp: , Rfl:     dofetilide (TIKOSYN) 250 MCG Cap, Take 1 capsule by mouth 2 (two) times a day., Disp: , Rfl:     finasteride (PROSCAR) 5 mg tablet, Take 5 mg by mouth nightly. , Disp: , Rfl:     hydrALAZINE (APRESOLINE) 25 MG tablet, Take 2 tablets (50 mg total) by mouth every 12 (twelve) hours. For BLOOD PRESSURE, Disp: 360 tablet, Rfl: 1    irbesartan (AVAPRO) 300 MG tablet, Take 150 mg by mouth once daily., Disp: , Rfl:     metoprolol succinate (TOPROL-XL) 25 MG 24 hr tablet, Take 25 mg by mouth 2 (two) times daily., Disp: , Rfl:     pantoprazole (PROTONIX) 40 MG tablet, Take 1 tablet (40 mg total) by mouth every morning. For REFLUX, Disp: 90 tablet, Rfl: 1    tamsulosin (FLOMAX) 0.4 mg Cap, Take by mouth once daily., Disp: , Rfl:     multivitamin (THERAGRAN) per tablet, Take 1 tablet by mouth once daily., Disp: , Rfl:     spironolactone (ALDACTONE) 25 MG tablet, Take 25 mg by mouth once daily., Disp: , Rfl:     Health Risk Assessment   Fall Risk: No   Advance Directive: Requested pt bring copy to clinic   Depression: PHQ9 score: 0    HTN: DASH diet, exercise, weight management, med compliance, home BP monitoring, and follow-up discussed.   T2DM: No   Tobacco use: Former. 12 pack years  STI: Not at risk    Alcohol misuse: No   Statin Use: Yes    Opioid Risk Score         Value Time User    Opioid Risk Score  0 5/1/2023 10:10 AM Paola Oneal RN               Health Risk Assessment  What is your age?: 80 or older  Are you male or female?: Male  During the past four weeks, how much have you been bothered by emotional problems such as feeling anxious, depressed, irritable, sad, or downhearted and blue?: Not at all  During the past five weeks, has your physical and/or emotional health limited your social activities with family, friends, neighbors, or groups?: Not at all  During the past  four weeks, how much bodily pain have you generally had?: Mild pain  During the past four weeks, was someone available to help if you needed and wanted help?: Yes, as much as I wanted  During the past four weeks, what was the hardest physical activity you could do for at least two minutes?: Moderate  Can you get to places out of walking distance without help?  (For example, can you travel alone on buses or taxis, or drive your own car?): Yes  Can you go shopping for groceries or clothes without someone's help?: Yes  Can you prepare your own meals?: Yes  Can you do your own housework without help?: Yes  Because of any health problems, do you need the help of another person with your personal care needs such as eating, bathing, dressing, or getting around the house?: No  Can you handle your own money without help?: Yes  During the past four weeks, how would you rate your health in general?: Fair  How have things been going for you during the past four weeks?: Pretty well  Are you having difficulties driving your car?: No  Do you always fasten your seat belt when you are in a car?: Yes, usually  How often in the past four weeks have you been bothered by falling or dizzy when standing up?: Never  How often in the past four weeks have you been bothered by sexual problems?: Never  How often in the past four weeks have you been bothered by trouble eating well?: Never  How often in the past four weeks have you been bothered by teeth or denture problems?: Never  How often in the past four weeks have you been bothered with problems using the telephone?: Never  How often in the past four weeks have you been bothered by tiredness or fatigue?: Sometimes  Have you fallen two or more times in the past year?: No  Are you afraid of falling?: No  Are you a smoker?: No  During the past four weeks, how many drinks of wine, beer, or other alcoholic beverages did you have?: No alcohol at all  Do you exercise for about 20 minutes three or  more days a week?: Yes, most of the time  Have you been given any information to help you with hazards in your house that might hurt you?: Yes  Have you been given any information to help you with keeping track of your medications?: Yes  How often do you have trouble taking medicines the way you've been told to take them?: I always take them as prescribed  How confident are you that you can control and manage most of your health problems?: Very confident  What is your race? (Check all that apply.):     Health Maintenance       Health Maintenance Topics with due status: Not Due       Topic Last Completion Date    Influenza Vaccine 11/16/2021    Eye Exam 07/18/2022    Aspirin/Antiplatelet Therapy 05/01/2023     Health Maintenance Due   Topic Date Due    PROSTATE-SPECIFIC ANTIGEN  Never done    TETANUS VACCINE  Never done    Abdominal Aortic Aneurysm Screening  Never done    Shingles Vaccine (1 of 2) 03/30/2012    Lipid Panel  03/01/2023       Incontinence  Bowel: No  Bladder: No    Lab results available in Epic or see dates from [facility] above:   Lab Results   Component Value Date    CHOL 113 03/01/2022     Lab Results   Component Value Date    HDL 27 (L) 03/01/2022     Lab Results   Component Value Date    LDLCALC 10 03/01/2022     Lab Results   Component Value Date    TRIG 378 (H) 03/01/2022     Lab Results   Component Value Date    CHOLHDL 4.2 03/01/2022       No results found for: LABA1C, HGBA1C    Sodium   Date Value Ref Range Status   03/01/2022 136 136 - 145 mmol/L Final     Potassium   Date Value Ref Range Status   03/01/2022 4.8 3.5 - 5.1 mmol/L Final     Chloride   Date Value Ref Range Status   03/01/2022 101 98 - 107 mmol/L Final     CO2   Date Value Ref Range Status   03/01/2022 28 21 - 32 mmol/L Final     Glucose   Date Value Ref Range Status   03/01/2022 103 74 - 106 mg/dL Final     BUN   Date Value Ref Range Status   03/01/2022 14 7 - 18 mg/dL Final     Creatinine   Date Value Ref Range Status  "  03/01/2022 1.09 0.70 - 1.30 mg/dL Final     Calcium   Date Value Ref Range Status   03/01/2022 8.8 8.5 - 10.1 mg/dL Final     Total Protein   Date Value Ref Range Status   06/17/2021 6.9 6.4 - 8.2 g/dL Final     Albumin   Date Value Ref Range Status   06/17/2021 4.1 3.5 - 5.0 g/dL Final     Bilirubin, Total   Date Value Ref Range Status   06/17/2021 0.4 >0.0 - 1.2 mg/dL Final     Alk Phos   Date Value Ref Range Status   06/17/2021 74 45 - 115 U/L Final     AST   Date Value Ref Range Status   06/17/2021 21 15 - 37 U/L Final     ALT   Date Value Ref Range Status   06/17/2021 29 16 - 61 U/L Final     Anion Gap   Date Value Ref Range Status   03/01/2022 12 7 - 16 mmol/L Final     eGFR   Date Value Ref Range Status   03/01/2022 69 >=60 mL/min/1.73m² Final       Care Team   PCP:     Eye specialist:    Cardiologist: [facility]   Pulmonologist:    Urologist:    Oncology:        **See Completed Assessments for Annual Wellness visit within the encounter summary    The following assessments were completed & reviewed:  Depression Screening  Cognitive function Screening  Timed Get Up Test  Whisper Test  Vision Screen  Health Risk Assessment  Checklist of ADLs and IADLs      Objective  Vitals:    05/01/23 0951   BP: 120/60   Pulse: (!) 57   Resp: 18   Temp: 98 °F (36.7 °C)   TempSrc: Oral   SpO2: 97%   Weight: 87.6 kg (193 lb 3.2 oz)   Height: 5' 10" (1.778 m)   PainSc: 0-No pain      Body mass index is 27.72 kg/m².  Ideal body weight: 73 kg (160 lb 15 oz)       Physical Exam  Vitals and nursing note reviewed.   Constitutional:       General: He is not in acute distress.     Appearance: Normal appearance. He is not ill-appearing.   Eyes:      Extraocular Movements: Extraocular movements intact.      Pupils: Pupils are equal, round, and reactive to light.   Cardiovascular:      Rate and Rhythm: Normal rate and regular rhythm.      Heart sounds: Normal heart sounds. "   Pulmonary:      Effort: Pulmonary effort is normal.      Breath sounds: Normal breath sounds.   Abdominal:      General: Bowel sounds are normal.      Palpations: Abdomen is soft.   Musculoskeletal:         General: Normal range of motion.   Skin:     Findings: No rash.   Neurological:      General: No focal deficit present.      Mental Status: He is alert and oriented to person, place, and time. Mental status is at baseline.   Psychiatric:         Mood and Affect: Mood normal.         Behavior: Behavior normal.         Assessment:     1. Encounter for subsequent annual wellness visit (AWV) in Medicare patient    2. BMI 27.0-27.9,adult    3. Mixed hyperlipidemia  - Lipid Panel; Future  - Lipid Panel    4. Aortic atherosclerosis    5. Chronic lymphocytic leukemia    6. ARDEN on CPAP    7. Gastroesophageal reflux disease without esophagitis    8. Benign prostatic hyperplasia with urinary frequency    9. Atrial fibrillation, unspecified type    10. Primary hypertension    11. Pulmonary fibrosis         Plan:    Referrals:        Advised to call office if does not hear from anyone with referral appt within 2-3 weeks to check on status of referral. Voiced understanding.      Discussed and provided with a screening schedule and personal prevention plan in accordance with USPSTF age appropriate recommendations and Medicare screening guidelines.   Education, counseling, and referrals were provided as needed.  After Visit Summary printed and given to patient which includes written education and a list of any referrals if indicated.     Education including advanced directives, diet, exercise, falls, and preventive health discussed with patient and patient verbalized understanding.      F/u plan for yearly AWV.    Signature:  John Victor MD

## 2023-05-02 PROCEDURE — 90853 GROUP PSYCHOTHERAPY: CPT | Mod: PN

## 2023-05-05 ENCOUNTER — PATIENT OUTREACH (OUTPATIENT)
Dept: ADMINISTRATIVE | Facility: HOSPITAL | Age: 83
End: 2023-05-05

## 2023-05-09 PROCEDURE — 90832 PSYTX W PT 30 MINUTES: CPT | Mod: PN

## 2023-05-09 PROCEDURE — 90853 GROUP PSYCHOTHERAPY: CPT | Mod: PN

## 2023-05-16 PROCEDURE — 90853 GROUP PSYCHOTHERAPY: CPT | Mod: PN

## 2023-05-23 PROCEDURE — 90853 GROUP PSYCHOTHERAPY: CPT | Mod: PN

## 2023-05-30 PROCEDURE — 90853 GROUP PSYCHOTHERAPY: CPT | Mod: PN

## 2023-06-06 PROCEDURE — 90853 GROUP PSYCHOTHERAPY: CPT | Mod: PN

## 2023-06-13 PROCEDURE — 90853 GROUP PSYCHOTHERAPY: CPT | Mod: PN

## 2023-06-20 ENCOUNTER — CLINICAL SUPPORT (OUTPATIENT)
Dept: PSYCHIATRY | Facility: HOSPITAL | Age: 83
End: 2023-06-20
Attending: PSYCHIATRY & NEUROLOGY
Payer: MEDICARE

## 2023-06-20 VITALS
TEMPERATURE: 98 F | DIASTOLIC BLOOD PRESSURE: 71 MMHG | SYSTOLIC BLOOD PRESSURE: 133 MMHG | RESPIRATION RATE: 20 BRPM | WEIGHT: 192 LBS | HEART RATE: 58 BPM | BODY MASS INDEX: 27.55 KG/M2

## 2023-06-20 DIAGNOSIS — Z63.4 BEREAVEMENT: ICD-10-CM

## 2023-06-20 DIAGNOSIS — F32.1 MAJOR DEPRESSIVE DISORDER, SINGLE EPISODE, MODERATE: ICD-10-CM

## 2023-06-20 PROCEDURE — 90853 GROUP PSYCHOTHERAPY: CPT | Mod: PN | Performed by: SOCIAL WORKER

## 2023-06-20 SDOH — SOCIAL DETERMINANTS OF HEALTH (SDOH): DISSAPEARANCE AND DEATH OF FAMILY MEMBER: Z63.4

## 2023-06-20 NOTE — PROGRESS NOTES
"Drove self to [facility], ambulates without difficulty, neatly dressed, Pleasant mood noted, Energy level "good".             Participated in all 3 group session today, left [facility] via private vehicle, ate 100% lunch meal on unit, no falls reported.  "

## 2023-06-22 PROBLEM — Z63.4 BEREAVEMENT: Status: ACTIVE | Noted: 2023-06-20

## 2023-06-22 PROBLEM — F32.1 MAJOR DEPRESSIVE DISORDER, SINGLE EPISODE, MODERATE: Status: ACTIVE | Noted: 2023-06-20

## 2023-06-22 NOTE — PROGRESS NOTES
"[facility]   Group Psychotherapy Note      Patient Name: Vaibhav Tong  Date: 6/20/23            Group 1    Time:  9:40 am - 10:25 am     Number of patients in attendance: 7    Group 1 Intervention: Process Group    Objective:  Behavior/Symptoms Addressed: Alert, Cooperative, and Attentive    Affect: Mood Congruent    Mood: Depressed    Patient's response to treatment: Good interaction, Able to follow directions, Responded without prompting, Maintained eye contact, Able to process issues, and No danger concerns noted    Comments: During this group session, group members explored signs and symptoms of depression in their lives. They reflected on any changes in presence, frequency, intensity or duration of symptoms, sharing about specific ways and to what extent these impact daily life. The purpose of this session was to allow a time of reflection and processing of mood related issues, as group members develop further insight into patterns of depression. Mr. Tong presented with fair mood and affect, reporting good sleep. He noted to have woken early at 4 AM, but expressed to feel rested. He shared about his Father's Day to include visits from all four of his children, sharing about a "family work day" as his gift. He shared how they worked together to improve a shed near his home while he "supervised." Pt identified no new symptoms.     Treatment Plan Goal Addressed:   Problem List Items Addressed This Visit          Psychiatric    Major depressive disorder, single episode, moderate       Other    Bereavement         Goals Addressed: 1D - Pt will identify 1-2 depression promoting thoughts, replacing these with mood-elevating thoughts by 7/30/23 .       Progress toward goals: Progressing adequately        Group 2    Time: 10:35 am - 11:20 am     Number of patients in attendance: 7    Group 2 Intervention: Mood Management and Coping Skills    Objective:  Behavior/Symptoms Addressed: " "Alert, Cooperative, and Attentive    Affect: Mood Congruent    Mood: Depressed    Patient's response to treatment: Good interaction, Able to follow directions, Responded without prompting, Maintained eye contact, Able to process issues, and No danger concerns noted    Comments: During this group session, group members completed a short map of their support networks. Group members were prompted to identify primary supports in their lives, sharing about what types of support are provided and about the trust and dependability within these relationships. As part of this discussion, group members also recognized any needs or deficits in their support networks. The purpose of this session was to increase coping abilities through identification and use of support networks.  Mr. Tong shared about his support network to include his four children and their spouses. He shared that he can count on some of these more than others and in different ways, identifying which he would call if feeling down and which he would call if he needed help with a project. He also shared about his "walking lucrecia" and [employer] family as supports.     Treatment Plan Goal Addressed:   Problem List Items Addressed This Visit          Psychiatric    Major depressive disorder, single episode, moderate       Other    Bereavement         Goals Addressed: 1E - Pt will develop a plan for early intervention, including warning signs, applicable coping skills and supports, at minimum, for if/when mood is becoming depressed in the future by 7/30/23 .       Progress toward goals: Progressing adequately        Group 3    Time: 11:30 am - 12:15 pm    Number of patients in attendance: 7    Group 3 Intervention: Self-Esteem and Reminiscence Therapy    Objective:  Behavior/Symptoms Addressed: Alert, Cooperative, and Attentive    Affect: Mood Congruent    Mood: Depressed    Patient's response to treatment: Minimal interaction, Required prompting, Maintained eye " contact, Able to process issues, and No danger concerns noted    Comments: During this group session, group members were prompted to share about steps they have taken to better themselves and/or their lives throughout their lifetime. This allowed group members to reflect on positive steps, moves, changes they have made and to view the benefits resulting from these. The purpose of this session was to increase a positive sense of self through reminiscence.  Mr. Tong was attentive and was responsive to his peers, but did not share any specific steps taken for the betterment in his life.     Treatment Plan Goal Addressed:   Problem List Items Addressed This Visit          Psychiatric    Major depressive disorder, single episode, moderate       Other    Bereavement         Goals Addressed: 1D - Pt will identify 1-2 depression promoting thoughts, replacing these with mood-elevating thoughts by 7/30/23 .  2C - Pt will realistically appraise the current (post-loss) life situation, determining needs for change and developing a plan to aid in ongoing adjustment to the new situation by 7/30/23 .       Progress toward goals: Progressing adequately        Jayshree Cueva LCSW

## 2023-06-27 ENCOUNTER — TELEPHONE (OUTPATIENT)
Dept: PSYCHIATRY | Facility: HOSPITAL | Age: 83
End: 2023-06-27
Payer: MEDICARE

## 2023-06-27 ENCOUNTER — CLINICAL SUPPORT (OUTPATIENT)
Dept: PSYCHIATRY | Facility: HOSPITAL | Age: 83
End: 2023-06-27
Attending: PSYCHIATRY & NEUROLOGY
Payer: MEDICARE

## 2023-06-27 VITALS
HEART RATE: 60 BPM | SYSTOLIC BLOOD PRESSURE: 156 MMHG | RESPIRATION RATE: 20 BRPM | TEMPERATURE: 99 F | DIASTOLIC BLOOD PRESSURE: 74 MMHG

## 2023-06-27 DIAGNOSIS — F32.0 MAJOR DEPRESSIVE DISORDER, SINGLE EPISODE, MILD: Primary | ICD-10-CM

## 2023-06-27 PROCEDURE — 90853 GROUP PSYCHOTHERAPY: CPT | Mod: PN | Performed by: SOCIAL WORKER

## 2023-06-27 NOTE — PROGRESS NOTES
Received patient to [facility] via personal vehicle. Ambulatory without assistive device. Denies recent falls. Patient voices good sleep last night. He denies complaints of pain. Reports going to drug store yesterday as well as [employer]. This RN observed patient anxiously picking at clothes and shuffling feet during conversation with staff and peers.    Patient attended 3 group therapy sessions. Appropriate interactions during lunch meal. Patient departed via personal vehicle. No falls or injury reported or observed while attending [facility] today.

## 2023-06-27 NOTE — PROGRESS NOTES
"[facility]   Group Psychotherapy Note      Patient Name: Vaibhav Tong  Date: 6/27/23            Group 1    Time:  9:00 am - 9:45 am     Number of patients in attendance: 4    Group 1 Intervention: Process Group    Objective:  Behavior/Symptoms Addressed: Alert, Cooperative, and Attentive    Affect: Mood Congruent    Mood: Depressed    Patient's response to treatment: Good interaction, Able to follow directions, Responded without prompting, Maintained eye contact, Able to process issues, and No danger concerns noted    Comments: During this group session, group members rated their presenting mood on a scale of 0-10, with 0 being low, depressed mood and with 10 being happy mood with healthy use of coping skills. Group members were encouraged to give context to this mood rating by sharing about specific thoughts, feelings and stressors contributing.  The purpose of this session was to allow a time of reflection and processing, developing insight into personal pattern of depression. Mr. Tong presented with fair mood and affect, rating this as an 8 on the scale provided. He shared that he has no complaints, new symptoms or stressors. He did reference feelings of loneliness experienced at [facility] at seeing that everyone had plans, but he did not. He expressed confidence that he could "cook up something" for July 4th, but expressed that he needs to plan toward this to prevent reoccurring feelings of loneliness and missing out. He shared to have a friend/ that he thought he could count on for this. Pt shared to have paused his walking, as result of the elevated temperatures this week, but looks forward to resuming this. He reported good sleep.     Treatment Plan Goal Addressed:   Problem List Items Addressed This Visit    None  Visit Diagnoses       Major depressive disorder, single episode, mild    -  Primary                Goals Addressed: 1D - Pt will identify 1-2 " "depression promoting thoughts, replacing these with mood-elevating thoughts by 7/30/23 .  2C - Pt will realistically appraise the current (post-loss) life situation, determining needs for change and developing a plan to aid in ongoing adjustment to the new situation by 7/30/23 .       Progress toward goals: Progressing adequately        Group 2    Time: 10:00 am - 10:45 am     Number of patients in attendance: 4    Group 2 Intervention: Reality Orientation and Validation Therapy    Objective:  Behavior/Symptoms Addressed: Alert, Cooperative, and Attentive    Affect: Mood Congruent    Mood: Depressed    Patient's response to treatment: Good interaction, Able to follow directions, Responded without prompting, Maintained eye contact, Able to process issues, and No danger concerns noted    Comments: During this group session, group members utilized a problem discovery worksheet and related discussion to make sense of current situations in their lives. They began by sharing about thoughts, feelings, and behaviors causing concern and explored what made them vulnerable to this, triggers that contribute, and things that keep the situation from improving. The purpose of this session was to realistically appraise the situation without judgement or an attempt to fix. Mr. Tong shared that he has had family problems, like most, in the past. He shared that following the "sudden death" of his wife, he has seen his family, children in particular, come closer. He stated, "the family support is better than it has ever been." He expressed gratitude for this "silver lining."     Treatment Plan Goal Addressed:   Problem List Items Addressed This Visit    None  Visit Diagnoses       Major depressive disorder, single episode, mild    -  Primary                Goals Addressed: 1E - Pt will develop a plan for early intervention, including warning signs, applicable coping skills and supports, at minimum, for if/when mood is becoming " depressed in the future by 7/30/23 .  2C - Pt will realistically appraise the current (post-loss) life situation, determining needs for change and developing a plan to aid in ongoing adjustment to the new situation by 7/30/23 .       Progress toward goals: Progressing adequately        Group 3    Time: 11:00 am - 11:45 am     Number of patients in attendance: 4    Group 3 Intervention: Coping Skills    Objective:  Behavior/Symptoms Addressed: Alert, Cooperative, and Attentive    Affect: Mood Congruent    Mood: Depressed    Patient's response to treatment: Good interaction, Able to follow directions, Responded without prompting, Maintained eye contact, Able to process issues, and No danger concerns noted    Comments: During this group session, group members discussed their sources of strength over their lifetime. Group members were prompted to think about what keeps them hanging on during the difficult times- a person, a belief, a tool or experience. Group members shared these, in an effort to draw on their own strengths and in a way that allows others to draw strength from one another. The purpose of this session was to recognize these strengths as coping skills in both the past and present, building intervention plans for group members upon discharge. Mr. Tong shared about his independence and his sarah as strengths in his life. He shared that being able to care for self, but also knowing the support provided him by his children is a strength. He shared how a young girl approached him, asking to pray for him last week. He noted that this was a fresh reminder of his sarah and its role in providing hope.     Treatment Plan Goal Addressed:   Problem List Items Addressed This Visit    None  Visit Diagnoses       Major depressive disorder, single episode, mild    -  Primary                Goals Addressed: 1D - Pt will identify 1-2 depression promoting thoughts, replacing these with mood-elevating thoughts by 7/30/23 .   1E - Pt will develop a plan for early intervention, including warning signs, applicable coping skills and supports, at minimum, for if/when mood is becoming depressed in the future by 7/30/23 .       Progress toward goals: Progressing adequately        Jayshree Cueva LCSW

## 2023-07-05 NOTE — PROGRESS NOTES
"[facility]   Group Psychotherapy Note      Patient Name: Vaibhav Tong  Date: 7/6/23            Group 1    Time:  9:20 am - 10:05 am     Number of patients in attendance: 7    Group 1 Intervention: Process Group    Objective:  Behavior/Symptoms Addressed: Alert, Cooperative, and Attentive    Affect: Mood Congruent    Mood: Depressed    Patient's response to treatment: Good interaction, Able to follow directions, Responded without prompting, Maintained eye contact, Able to process issues, and No danger concerns noted    Comments: During this group session, group members rated their presenting mood on a scale of 0-10, with 0 being low, depressed mood and with 10 being happy mood with healthy use of coping skills. Group members were encouraged to give context to this mood rating by sharing about specific thoughts, feelings and stressors contributing.  The purpose of this session was to allow a time of reflection and processing, developing insight into personal pattern of depression. Mr. Tong presented with fair mood and affect, rating this as a 9 on the scale provided. He shared that his weekend was "quiet," with little plans due to his family all having plans of their own. He denied making connections with friends, as he thought he might. Pt reported good sleep and denied any new symptoms or stressors. He shared to be staying active with [employer] attendance, watching his daughter's place while she was on vacation, and getting out around town.     Treatment Plan Goal Addressed:   Problem List Items Addressed This Visit          Psychiatric    Major depressive disorder, single episode, moderate - Primary           Goals Addressed: 1D - Pt will identify 1-2 depression promoting thoughts, replacing these with mood-elevating thoughts by 7/30/23 .  1E - Pt will develop a plan for early intervention, including warning signs, applicable coping skills and supports, at minimum, for if/when " "mood is becoming depressed in the future by 7/30/23 .       Progress toward goals: Progressing adequately        Group 2    Time: 10:15 am - 11:00 am     Number of patients in attendance: 7    Group 2 Intervention: Reality Orientation and Validation Therapy    Objective:  Behavior/Symptoms Addressed: Alert, Cooperative, and Attentive    Affect: Mood Congruent    Mood: Depressed and Anxious    Patient's response to treatment: Good interaction, Increased self-expression, Able to follow directions, Responded without prompting, Maintained eye contact, Able to process issues, and No danger concerns noted    Comments: During this group session, group members were given a handout depicting a Ball of Grief. Within this ball, were various strands labeled with emotional experiences, including, but not limited to, resentment, emptiness, confusion, guilt, etc. Group members were allowed time to reflect on this tangled ball of emotions and how they saw their own grief in this depiction. Group members shared, as they felt needed. The purpose of this session was to open a time of sharing, providing validation for the vulnerable and complicated emotions related to loss.  Mr. Tong shared about feelings of "anxiety" and "uncertainty" that persist. He verbalized a level of acceptance of the loss of his wife, but noted that learning to live without her continues to be an adjustment. He stated, "what do I wear? What so I take when I go somewhere?" expressing to "want to do it right," while recognizing that these are things that he depended on her for.     Treatment Plan Goal Addressed:   Problem List Items Addressed This Visit          Psychiatric    Major depressive disorder, single episode, moderate - Primary           Goals Addressed: 2C - Pt will realistically appraise the current (post-loss) life situation, determining needs for change and developing a plan to aid in ongoing adjustment to the new situation by 7/30/23 . " "      Progress toward goals: Progressing adequately        Group 3    Time: 11:10 am - 11:55 am     Number of patients in attendance: 7    Group 3 Intervention: Behavior Therapy, Validation Therapy, and Coping Skills    Objective:  Behavior/Symptoms Addressed: Alert, Cooperative, and Attentive    Affect: Mood Congruent    Mood: Depressed    Patient's response to treatment: Good interaction, Able to follow directions, Responded without prompting, Maintained eye contact, Able to process issues, and No danger concerns noted    Comments: During this group session, group members were prompted to reflect on and share about a privilege that they enjoy that others may not be able to. They discussed a privilege to be a special right or advantage that one person or group of people is granted. Group members maintained respect of their peers while they explored specific differentiating factors that may include culture, education, etc. Group members shared their experiences only, expressing empathy and validation for those without this privilege and expressing gratitude that they have been given such.  Mr. 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 He also noted this to have made [facility] very difficult.     Treatment Plan Goal Addressed:   Problem List Items Addressed This Visit          Psychiatric    Major depressive disorder, single episode, moderate - Primary           Goals Addressed: 1E - Pt will develop a plan for early intervention, including warning signs, applicable coping skills and supports, at minimum, for if/when mood is becoming depressed in the future by 7/30/23 .       Progress toward goals: Progressing adequately        Jayshree Cueva LCSW            "

## 2023-07-06 ENCOUNTER — CLINICAL SUPPORT (OUTPATIENT)
Dept: PSYCHIATRY | Facility: HOSPITAL | Age: 83
End: 2023-07-06
Attending: PSYCHIATRY & NEUROLOGY
Payer: MEDICARE

## 2023-07-06 VITALS
HEART RATE: 59 BPM | BODY MASS INDEX: 27.69 KG/M2 | RESPIRATION RATE: 20 BRPM | SYSTOLIC BLOOD PRESSURE: 138 MMHG | DIASTOLIC BLOOD PRESSURE: 68 MMHG | WEIGHT: 193 LBS | TEMPERATURE: 98 F

## 2023-07-06 DIAGNOSIS — F32.1 MAJOR DEPRESSIVE DISORDER, SINGLE EPISODE, MODERATE: Primary | ICD-10-CM

## 2023-07-06 PROCEDURE — 90853 GROUP PSYCHOTHERAPY: CPT | Performed by: SOCIAL WORKER

## 2023-07-06 NOTE — PROGRESS NOTES
Patient drove self to [facility], greeting peers/staff upon entering group area, Neatly dressed, Denies any s/s of Covid, Mood pleasant, Denies any falls, Vital signs obtained, states he has medical appointment with pulmonologist on 7/10/23 for check up.  Water/snack given upon request.  Participating in group therapy.     Attended all 3 group therapy sessions, ate 100% lunch meal on unit, no complaints voiced, No falls noted/voiced while at [facility]. Left [facility] in private vehicle driven by self.

## 2023-07-10 NOTE — PROGRESS NOTES
[facility]   Group Psychotherapy Note      Patient Name: Vaibhav Tong  Date: 7.11.23            Group 1    Time:  6066-4805    Number of patients in attendance: 6    Group 1 Intervention: Process Group    Objective:  Behavior/Symptoms Addressed: Alert, Oriented, Cooperative, and Attentive    Affect: Mood Congruent, Pleasant, and [place]    Mood: Depressed, Anxious, and Sad    Patient's response to treatment: Good interaction, Identified issues, Able to follow directions, Required prompting, Maintained eye contact, Able to process issues, and No danger concerns noted    Comments: LCSW provided group therapy to allow group members an opportunity to verbalize any issues or concerns that they are currently dealing with and/or to process any issues from group discussion.  The purpose of the group was to give each group member time to explore and process their feelings related to recent situations or circumstances that may have been discussed in group or issues that they have not previously explored and describe or rate their current mood using a 1-10 self rating scale. Client was verbal in group but hesitant at times with new therapist present. He is very respectful of the ladies in the group and would always allow them talk first. 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 He did rate his mood as an 8-9 this morning stating that he does better when he is able to get out because when you live alone it does get lonely sometimes.     Treatment Plan Goal Addressed:   Problem List Items Addressed This Visit    Depression          Goals Addressed: 1D - Pt will identify 1-2 depression promoting thoughts, replacing these with mood-elevating thoughts by target date .       Progress toward goals:  "Progressing adequately        Group 2    Time: 4885-0817    Number of patients in attendance: 6    Group 2 Intervention: Mood Management and Self-Esteem    Objective:  Behavior/Symptoms Addressed: Alert, Oriented, Cooperative, and Attentive    Affect: Mood Congruent, Pleasant, and [place]    Mood: Depressed and Anxious    Patient's response to treatment: Good interaction, Increased self-expression, Identified issues, Able to follow directions, Responded without prompting, Maintained eye contact, Able to process issues, and No danger concerns noted    Comments: LCSW provided group therapy to begin discussion using an exercise entitled "14 Activities to Build High Self-esteem and Self-worth".  The purpose of the group was to assist clients in identifying specific things that they can work on in order to improve their feelings of worth as a person and in the role that they play in life. We began the exercise by discussing some strengths, some achievements, and others lives that were made easier because of your presence. He did talk about how he would like to think that his presence made his children and grandchildren's lives better but that we would have to ask them what they thought.  He states that he does feel like he taught his grandchildren some life lessons because when they came to him to borrow money he would find something for them to do around the house and make them earn the money verses just giving it to them. He states that he does feel good about teaching them those things which have helped make them responsible adults.    Treatment Plan Goal Addressed: Depression      Goals Addressed: 2C - Pt will realistically appraise the current (post-loss) life situation, determining needs for change and developing a plan to aid in ongoing adjustment to the new situation by target date .     Progress toward goals: Progressing adequately        Group 3    Time: 6323-5485  ( Client not present - left early stating that " he was not feeling well)    Number of patients in attendance: 5    Group 3 Intervention: Coping Skills    Objective:  Behavior/Symptoms Addressed:  not present    Affect:  not present    Mood:  not present    Patient's response to treatment:  not present    Comments: not present    Treatment Plan Goal Addressed:   Problem List Items Addressed This Visit    Depression          Goals Addressed: 1E - Pt will develop a plan for early intervention, including warning signs, applicable coping skills and supports, at minimum, for if/when mood is becoming depressed in the future by target date .       Progress toward goals: not present        Kajal Lauren LCSW

## 2023-07-11 ENCOUNTER — CLINICAL SUPPORT (OUTPATIENT)
Dept: PSYCHIATRY | Facility: HOSPITAL | Age: 83
End: 2023-07-11
Attending: PSYCHIATRY & NEUROLOGY
Payer: MEDICARE

## 2023-07-11 VITALS
DIASTOLIC BLOOD PRESSURE: 72 MMHG | TEMPERATURE: 98 F | HEART RATE: 59 BPM | RESPIRATION RATE: 20 BRPM | SYSTOLIC BLOOD PRESSURE: 141 MMHG

## 2023-07-11 DIAGNOSIS — F32.1 MAJOR DEPRESSIVE DISORDER, SINGLE EPISODE, MODERATE: Primary | ICD-10-CM

## 2023-07-11 PROCEDURE — 90853 GROUP PSYCHOTHERAPY: CPT | Performed by: SOCIAL WORKER

## 2023-07-11 NOTE — PROGRESS NOTES
"[facility]  [facility] DAILY NURSING NOTE      Name: Vaibhav Tong   MRN: [id number]   YOB: 1940; Age: 83 y.o.   Gender: Male                                  Date: 07/11/2023                 Arrival Mode: POV    Behavior: Alert, Oriented, and Cooperative    Physical Condition: Relaxed    Affect: Pleasant and Calm    Mood: Depressed    Thought Processes: Coherent    Thought Content: NA    Interaction with Staff/Peers: Friendly and Cooperative    Level of Function: Self-Directed    Appetite: Normal Appetite    Sleep: no complaints    Appearance: well-groomed, appropriate    Attendance: Present for: Group Therapy 2    Departure: POV    Additional Notes:   Arrived to unit at 8:45am, ambulates without any assistive devices, steady gait noted.  Greeting peers/staff upon entering group area, voices he had f/u with pulmonologist yesterday "everything was good".   States his daughter from [place] came for the weekend and drove him.       11:20 am -Patient voiced he needed to leave due to "not feeling well...my stomach is rolling".  Patient only attended 2 group therapy sessions today.  No falls while at [facility].           Dinora Acuna RN       "

## 2023-07-13 ENCOUNTER — HOSPITAL ENCOUNTER (OUTPATIENT)
Dept: RADIOLOGY | Facility: HOSPITAL | Age: 83
Discharge: HOME | End: 2023-07-13
Attending: SURGERY
Payer: MEDICARE

## 2023-07-13 ENCOUNTER — OFFICE VISIT (OUTPATIENT)
Dept: SURGERY | Facility: CLINIC | Age: 83
End: 2023-07-13
Payer: MEDICARE

## 2023-07-13 VITALS — BODY MASS INDEX: 27.2 KG/M2 | OXYGEN SATURATION: 96 % | HEIGHT: 70 IN | WEIGHT: 190 LBS

## 2023-07-13 DIAGNOSIS — I65.21 CAROTID STENOSIS, RIGHT: Primary | ICD-10-CM

## 2023-07-13 DIAGNOSIS — I65.23 BILATERAL CAROTID ARTERY STENOSIS: ICD-10-CM

## 2023-07-13 PROCEDURE — 99213 OFFICE O/P EST LOW 20 MIN: CPT | Mod: S$PBB,,, | Performed by: SURGERY

## 2023-07-13 PROCEDURE — 99214 OFFICE O/P EST MOD 30 MIN: CPT | Mod: PBBFAC,25 | Performed by: SURGERY

## 2023-07-13 PROCEDURE — 99213 PR OFFICE/OUTPT VISIT, EST, LEVL III, 20-29 MIN: ICD-10-PCS | Mod: S$PBB,,, | Performed by: SURGERY

## 2023-07-13 PROCEDURE — 93880 EXTRACRANIAL BILAT STUDY: CPT | Mod: 26,,, | Performed by: SURGERY

## 2023-07-13 PROCEDURE — 93880 US CAROTID BILATERAL: ICD-10-PCS | Mod: 26,,, | Performed by: SURGERY

## 2023-07-13 PROCEDURE — 93880 EXTRACRANIAL BILAT STUDY: CPT | Mod: TC

## 2023-07-13 RX ORDER — HYDRALAZINE HYDROCHLORIDE 50 MG/1
TABLET, FILM COATED ORAL
COMMUNITY
Start: 2023-06-08

## 2023-07-13 NOTE — PROGRESS NOTES
"  Vascular clinic    Asymptomatic carotid disease    Right ICA less 50% based on velocities, ratio 50-69%.  Similar previous study CT angio list of 50% right no significant disease of the left    07/13/2023  followed by dr cherry for carotid stenosis, this is my first time seeing patient I have reviewed medical records PMH to include HTN< nondiabetic AF, chronic leukemia followed by dr mitchell, nonsmoker, follow up carotid us 1 year, asymptomatic, carotid duplex IC/CC ratio 2.7,on the right  carotid IC/CC ratio 0.98 on the left no new symptoms denies tia or cva,     Subjective:       Patient ID: Vaibhav Tong is a 83 y.o. male.    Chief Complaint: Follow-up (US today)    family history includes Heart disease in his brother and sister; Hypertension in his mother and sister; No Known Problems in his father.  Past Medical History:   Diagnosis Date    Atrial fibrillation     BPH (benign prostatic hyperplasia)     Chronic lymphocytic leukemia     GERD (gastroesophageal reflux disease)     Hyperlipidemia     Hypertension     Myocardial infarction     Pulmonary fibrosis     Sleep apnea, unspecified       Past Surgical History:   Procedure Laterality Date    abaltion      APPENDECTOMY         reports that he quit smoking about 53 years ago. His smoking use included cigarettes. He started smoking about 65 years ago. He has a 12.00 pack-year smoking history. He has been exposed to tobacco smoke. He has never used smokeless tobacco. He reports that he does not drink alcohol and does not use drugs.   HPI  Review of Systems      Objective:      Ht 5' 10" (1.778 m)   Wt 86.2 kg (190 lb)   SpO2 96%   BMI 27.26 kg/m²    Physical Exam      Assessment:       1. Carotid stenosis, right        Plan:       Continue statin, asa, BP control, follow up with carotid duplex 1 year for asymptomatic carotid stenosis call or return for any problems      "

## 2023-07-18 ENCOUNTER — CLINICAL SUPPORT (OUTPATIENT)
Dept: PSYCHIATRY | Facility: HOSPITAL | Age: 83
End: 2023-07-18
Attending: PSYCHIATRY & NEUROLOGY
Payer: MEDICARE

## 2023-07-18 VITALS
TEMPERATURE: 99 F | RESPIRATION RATE: 20 BRPM | SYSTOLIC BLOOD PRESSURE: 138 MMHG | DIASTOLIC BLOOD PRESSURE: 68 MMHG | HEART RATE: 60 BPM

## 2023-07-18 DIAGNOSIS — F32.1 MAJOR DEPRESSIVE DISORDER, SINGLE EPISODE, MODERATE: Primary | ICD-10-CM

## 2023-07-18 PROCEDURE — 90853 GROUP PSYCHOTHERAPY: CPT | Performed by: SOCIAL WORKER

## 2023-07-18 NOTE — PROGRESS NOTES
[facility]      Name: Vaibhav Tong   MRN: [id number]   YOB: 1940; Age: 83 y.o.   Gender: Male                                  Date: 07/18/2023                 Arrival Mode: POV    Behavior: Alert, Oriented, and Cooperative    Physical Condition: Relaxed    Affect: Pleasant and Calm    Mood:  Pleasant     Thought Processes: Coherent    Thought Content: NA    Interaction with Staff/Peers: Friendly and Cooperative    Level of Function: Self-Directed    Appetite: Normal Appetite    Sleep: no complaints    Appearance: well-groomed, appropriate    Attendance: Present for: Group Therapy 1, Group Therapy 2, and Group Therapy 3    Departure: POV    Additional Notes:   Ambulates without difficulty, steady gait observed.  Voices he received his protein infusion yesterday, afterwards he slept all evening due to taking 2 benadryl's prior to infusion.  Says his energy level is good as well as his sleep.  Looking forward to visiting with daughter in [place], [place] this weekend celebrating her 60th Birthday.         12:50 pm Tx team review [facility] with [facility], therapist Jayshree Cueva LCSW, nurse Natalia Acuna RN, and psychiatrist Dr. Fazal Henry.   Orders received to discharge patient from [facility].  Goals attained. Denies any HI/SI.  Discharge Summary given/explained to patient with understanding voiced.     Ate 100% lunch meal on unit, No falls while at [facility] today.          Dinora Acuna RN

## 2023-07-18 NOTE — PROGRESS NOTES
[facility]  Outpatient Psychiatric Progress Note      Patient Name: Vaibhav Tong  MRN: [id number]   Patient : 1940  Chief Complaint:  Depression; bereavement    SUBJECTIVE:   History of Present Illness:   Vaibhav Tong is a 83 y.o. male seen today for IOP follow-up visit.  His case was discussed with the treatment team.  He is attended for [facility].  He is in bright spirits today.  His affect is bright.  His sleep is adequate.  His appetite is good.  His energy level has improved.  He is coping well with stressors.  He is doing well on current medication regimen.  No medication side effects reported.  We feel he has reached maximum benefit from the program.  We will discharge him today.      Current Medications:     Current Outpatient Medications:     aspirin (ECOTRIN) 81 MG EC tablet, Take 81 mg by mouth., Disp: , Rfl:     atorvastatin (LIPITOR) 40 MG tablet, Take 1 tablet (40 mg total) by mouth nightly., Disp: 90 tablet, Rfl: 1    clopidogreL (PLAVIX) 75 mg tablet, Take 75 mg by mouth once daily., Disp: , Rfl:     dofetilide (TIKOSYN) 250 MCG Cap, Take 1 capsule by mouth 2 (two) times a day., Disp: , Rfl:     finasteride (PROSCAR) 5 mg tablet, Take 5 mg by mouth nightly. , Disp: , Rfl:     hydrALAZINE (APRESOLINE) 50 MG tablet, , Disp: , Rfl:     irbesartan (AVAPRO) 300 MG tablet, Take 150 mg by mouth once daily., Disp: , Rfl:     metoprolol succinate (TOPROL-XL) 25 MG 24 hr tablet, Take 25 mg by mouth 2 (two) times daily., Disp: , Rfl:     multivitamin (THERAGRAN) per tablet, Take 1 tablet by mouth once daily., Disp: , Rfl:     pantoprazole (PROTONIX) 40 MG tablet, Take 1 tablet (40 mg total) by mouth every morning. For REFLUX, Disp: 90 tablet, Rfl: 1    spironolactone (ALDACTONE) 25 MG tablet, Take 25 mg by mouth once daily., Disp: , Rfl:     tamsulosin (FLOMAX) 0.4 mg Cap, Take by mouth once daily., Disp: , Rfl:     Compliant with Medication: Yes/No: Yes    OBJECTIVE:      Psychiatric Mental Status Exam:  Appearance: well-groomed, appropriate  Behavior/Cooperation: normal, cooperative   Eye Contact: good  Motor Activity:  Normal  Affect: appropriate  Mood:  Good  Speech: normal tone, normal rate, normal pitch, normal volume  Thought Process: normal and logical  Thought Content:    Hallucinations: none   Delusions: none   Suicidal: NO   Homicidal: NO   Poverty of Content: NO  Orientation: Oriented x3  Recent Memory: Recent Memory: WNL   Remote Memory: Remote Memory: WNL   Insight: Insight: Good   Judgment: Judgement: Good       ASSESSMENT/PLAN:     Diagnosis:  1. Major depressive disorder, single, moderate 2. Bereavement    Plan:    1. Discharge from [facility] 2. Patient will continue current medications 3. Patient will follow up with his primary care physician or local mental health care provider  His  Clint Henry MD  [facility]

## 2023-07-19 NOTE — PROGRESS NOTES
"[facility]   Group Psychotherapy Note      Patient Name: Vaibhav Tong  Date: 7/18/23            Group 1    Time:  9:00 am - 9:50 am     Number of patients in attendance: 8    Group 1 Intervention: Process Group    Objective:  Behavior/Symptoms Addressed: Alert, Cooperative, and Attentive    Affect: Mood Congruent    Mood: Depressed    Patient's response to treatment: Good interaction, Able to follow directions, Responded without prompting, Maintained eye contact, Able to process issues, and No danger concerns noted    Comments: During this group session, group members rated their presenting mood on a scale of 0-10, with 0 being low, depressed mood and with 10 being happy mood with healthy use of coping skills. Group members were encouraged to give context to this mood rating by sharing about specific thoughts, feelings and stressors contributing.  The purpose of this session was to allow a time of reflection and processing, developing insight into personal pattern of depression. Mr. Tong presented with fair mood and affect, rating his mood at as 9 on the scale provided. He reported to have had a protein infusion on yesterday, reporting to have gone to sleep upon returning home and sleeping all night. Pt reported to feel rested and more energetic following the injection. Pt shared that although he has not been walking due to weather and his "walking lucrecia" having an injury, he continues to be active "around town." Pt shared that his daughter visited last week and that he has plans to stay the weekend at her home in celebration of her birthday.     Treatment Plan Goal Addressed:   Problem List Items Addressed This Visit          Psychiatric    Major depressive disorder, single episode, moderate - Primary           Goals Addressed: 1E - Pt will develop a plan for early intervention, including warning signs, applicable coping skills and supports, at minimum, for if/when mood is " "becoming depressed in the future by 8/31/23 .  2C - Pt will realistically appraise the current (post-loss) life situation, determining needs for change and developing a plan to aid in ongoing adjustment to the new situation by 8/31/23 .       Progress toward goals: Progressing adequately        Group 2    Time: 10:00 am - 10:45 am 8    Number of patients in attendance: 8    Group 2 Intervention: Mood Management, Behavior Therapy, Reality Orientation, and Validation Therapy    Objective:  Behavior/Symptoms Addressed: Alert, Cooperative, and Attentive    Affect: Mood Congruent    Mood: Depressed    Patient's response to treatment: Good interaction, Able to follow directions, Responded without prompting, Maintained eye contact, Able to process issues, and No danger concerns noted    Comments: During this group session, group members were encouraged to reflect on and share about ways in which they would respond to a friend or loved one who was feeling down or going through a time of struggle. Group members shared empathetic and intentional responses. Group members were then asked to compare these responses with the ways in which they think/feel/respond to themselves when feeling down or in a time of struggle. The purpose of this session was to create dissonance and awareness of the need for vinnie and gentleness toward self as group members work toward improved emotional regulation.  Mr. Tong was receptive and in agreement with his peers suggestions of supporting others. He also added that if he is not "equipped," he will try to make connections and find resources that are needed.     Treatment Plan Goal Addressed:   Problem List Items Addressed This Visit          Psychiatric    Major depressive disorder, single episode, moderate - Primary           Goals Addressed: 1E - Pt will develop a plan for early intervention, including warning signs, applicable coping skills and supports, at minimum, for if/when mood is becoming " "depressed in the future by 8/31/23 .       Progress toward goals: Progressing adequately        Group 3    Time: 11:00 am - 11:45 am     Number of patients in attendance: 8    Group 3 Intervention: Coping Skills and Relaxation Therapy    Objective:  Behavior/Symptoms Addressed: Alert, Cooperative, and Attentive    Affect: Mood Congruent    Mood: Depressed    Patient's response to treatment: Good interaction, Able to follow directions, Responded without prompting, Maintained eye contact, Able to process issues, and No danger concerns noted    Comments: During this group session, group members explored specific aspects of nature. They discussed nature to reduce physiological symptoms of stress and enhance positive emotions. Group members also recognized a deeper connection to a higher being through being in nature. The purpose of this session was to expand coping resources to include soothing/relaxing aspects of nature. Mr. Tong shared to think about a "running creek" as a calming part of nature. He also shared about enjoying flowers, noting that his wife taught him to enjoy these.       Treatment Plan Goal Addressed:   Problem List Items Addressed This Visit          Psychiatric    Major depressive disorder, single episode, moderate - Primary           Goals Addressed: 1E - Pt will develop a plan for early intervention, including warning signs, applicable coping skills and supports, at minimum, for if/when mood is becoming depressed in the future by 8/31/23 .  2C - Pt will realistically appraise the current (post-loss) life situation, determining needs for change and developing a plan to aid in ongoing adjustment to the new situation by 8/31/23 .       Progress toward goals: Progressing adequately        Jayshree Cueva LCSW            "

## 2023-07-24 PROCEDURE — 88305 TISSUE EXAM BY PATHOLOGIST: CPT | Mod: 26,,, | Performed by: PATHOLOGY

## 2023-07-24 PROCEDURE — 88341 IMHCHEM/IMCYTCHM EA ADD ANTB: CPT | Mod: 26,,, | Performed by: PATHOLOGY

## 2023-07-24 PROCEDURE — 88312 SPECIAL STAINS GROUP 1: CPT | Mod: 26,,, | Performed by: PATHOLOGY

## 2023-07-24 PROCEDURE — 88305 PATHOLOGY, DERMATOLOGY: ICD-10-PCS | Mod: 26,,, | Performed by: PATHOLOGY

## 2023-07-24 PROCEDURE — 88312 PATHOLOGY, DERMATOLOGY: ICD-10-PCS | Mod: 26,,, | Performed by: PATHOLOGY

## 2023-07-24 PROCEDURE — 88342 IMHCHEM/IMCYTCHM 1ST ANTB: CPT | Mod: 26,,, | Performed by: PATHOLOGY

## 2023-07-24 PROCEDURE — 88305 TISSUE EXAM BY PATHOLOGIST: CPT | Mod: TC,SUR

## 2023-07-24 PROCEDURE — 88342 PATHOLOGY, DERMATOLOGY: ICD-10-PCS | Mod: 26,,, | Performed by: PATHOLOGY

## 2023-07-24 PROCEDURE — 88341 PATHOLOGY, DERMATOLOGY: ICD-10-PCS | Mod: 26,,, | Performed by: PATHOLOGY

## 2023-07-25 ENCOUNTER — LAB REQUISITION (OUTPATIENT)
Dept: LAB | Facility: HOSPITAL | Age: 83
End: 2023-07-25
Payer: MEDICARE

## 2023-07-25 DIAGNOSIS — D49.2 NEOPLASM OF UNSPECIFIED BEHAVIOR OF BONE, SOFT TISSUE, AND SKIN: ICD-10-CM

## 2023-07-26 LAB
DHEA SERPL-MCNC: NORMAL
ESTROGEN SERPL-MCNC: NORMAL PG/ML
INSULIN SERPL-ACNC: NORMAL U[IU]/ML
LAB AP GROSS DESCRIPTION: NORMAL
LAB AP LABORATORY NOTES: NORMAL
LAB AP SPEC A DDX: NORMAL
LAB AP SPEC A MORPHOLOGY: NORMAL
LAB AP SPEC A PROCEDURE: NORMAL
LAB AP SPEC B DDX: NORMAL
LAB AP SPEC B MORPHOLOGY: NORMAL
LAB AP SPEC B PROCEDURE: NORMAL
LAB AP SPEC C DDX: NORMAL
LAB AP SPEC C MORPHOLOGY: NORMAL
LAB AP SPEC C PROCEDURE: NORMAL
T3RU NFR SERPL: NORMAL %

## 2023-08-28 DIAGNOSIS — E78.2 MIXED HYPERLIPIDEMIA: Chronic | ICD-10-CM

## 2023-08-28 RX ORDER — ATORVASTATIN CALCIUM 40 MG/1
40 TABLET, FILM COATED ORAL NIGHTLY
Qty: 90 TABLET | Refills: 1 | Status: SHIPPED | OUTPATIENT
Start: 2023-08-28 | End: 2024-02-27 | Stop reason: SDUPTHER

## 2023-11-02 ENCOUNTER — TELEPHONE (OUTPATIENT)
Dept: FAMILY MEDICINE | Facility: CLINIC | Age: 83
End: 2023-11-02
Payer: MEDICARE

## 2023-11-02 ENCOUNTER — OFFICE VISIT (OUTPATIENT)
Dept: FAMILY MEDICINE | Facility: CLINIC | Age: 83
End: 2023-11-02
Payer: MEDICARE

## 2023-11-02 VITALS
OXYGEN SATURATION: 96 % | SYSTOLIC BLOOD PRESSURE: 130 MMHG | HEIGHT: 70 IN | BODY MASS INDEX: 28.26 KG/M2 | RESPIRATION RATE: 16 BRPM | TEMPERATURE: 97 F | DIASTOLIC BLOOD PRESSURE: 70 MMHG | WEIGHT: 197.38 LBS | HEART RATE: 67 BPM

## 2023-11-02 DIAGNOSIS — N40.1 BENIGN PROSTATIC HYPERPLASIA WITH URINARY FREQUENCY: Chronic | ICD-10-CM

## 2023-11-02 DIAGNOSIS — R35.0 BENIGN PROSTATIC HYPERPLASIA WITH URINARY FREQUENCY: Chronic | ICD-10-CM

## 2023-11-02 DIAGNOSIS — I42.0 DILATED CARDIOMYOPATHY: Chronic | ICD-10-CM

## 2023-11-02 DIAGNOSIS — E78.2 MIXED HYPERLIPIDEMIA: Chronic | ICD-10-CM

## 2023-11-02 DIAGNOSIS — Z12.5 SCREENING FOR PROSTATE CANCER: ICD-10-CM

## 2023-11-02 DIAGNOSIS — I10 PRIMARY HYPERTENSION: Primary | ICD-10-CM

## 2023-11-02 PROBLEM — I48.0 PAROXYSMAL ATRIAL FIBRILLATION: Chronic | Status: ACTIVE | Noted: 2021-08-25

## 2023-11-02 LAB
ANION GAP SERPL CALCULATED.3IONS-SCNC: 8 MMOL/L (ref 7–16)
ATYPICAL LYMPHOCYTES: ABNORMAL
BASOPHILS # BLD AUTO: 0.33 K/UL (ref 0–0.2)
BASOPHILS NFR BLD AUTO: 0.6 % (ref 0–1)
BILIRUB UR QL STRIP: NEGATIVE
BUN SERPL-MCNC: 15 MG/DL (ref 7–18)
BUN/CREAT SERPL: 14 (ref 6–20)
CALCIUM SERPL-MCNC: 8.9 MG/DL (ref 8.5–10.1)
CHLORIDE SERPL-SCNC: 101 MMOL/L (ref 98–107)
CHOLEST SERPL-MCNC: 105 MG/DL (ref 0–200)
CHOLEST/HDLC SERPL: 3.8 {RATIO}
CLARITY UR: CLEAR
CO2 SERPL-SCNC: 28 MMOL/L (ref 21–32)
COLOR UR: NORMAL
CREAT SERPL-MCNC: 1.09 MG/DL (ref 0.7–1.3)
CREAT UR-MCNC: 48 MG/DL (ref 39–259)
DIFFERENTIAL METHOD BLD: ABNORMAL
EGFR (NO RACE VARIABLE) (RUSH/TITUS): 67 ML/MIN/1.73M2
EOSINOPHIL # BLD AUTO: 0.77 K/UL (ref 0–0.5)
EOSINOPHIL NFR BLD AUTO: 1.3 % (ref 1–4)
EOSINOPHIL NFR BLD MANUAL: 3 % (ref 1–4)
ERYTHROCYTE [DISTWIDTH] IN BLOOD BY AUTOMATED COUNT: 14.5 % (ref 11.5–14.5)
GLUCOSE SERPL-MCNC: 107 MG/DL (ref 74–106)
GLUCOSE UR STRIP-MCNC: NORMAL MG/DL
HCT VFR BLD AUTO: 39 % (ref 40–54)
HDLC SERPL-MCNC: 28 MG/DL (ref 40–60)
HGB BLD-MCNC: 12.7 G/DL (ref 13.5–18)
IMM GRANULOCYTES # BLD AUTO: 0.15 K/UL (ref 0–0.04)
IMM GRANULOCYTES NFR BLD: 0.3 % (ref 0–0.4)
KETONES UR STRIP-SCNC: NEGATIVE MG/DL
LDLC SERPL CALC-MCNC: 26 MG/DL
LEUKOCYTE ESTERASE UR QL STRIP: NEGATIVE
LYMPHOCYTES # BLD AUTO: 49.56 K/UL (ref 1–4.8)
LYMPHOCYTES NFR BLD AUTO: 83.2 % (ref 27–41)
LYMPHOCYTES NFR BLD MANUAL: 78 % (ref 27–41)
MCH RBC QN AUTO: 28.6 PG (ref 27–31)
MCHC RBC AUTO-ENTMCNC: 32.6 G/DL (ref 32–36)
MCV RBC AUTO: 87.8 FL (ref 80–96)
MICROALBUMIN UR-MCNC: <0.5 MG/DL (ref 0–2.8)
MICROALBUMIN/CREAT RATIO PNL UR: <10.4 MG/G (ref 0–30)
MONOCYTES # BLD AUTO: 2.73 K/UL (ref 0–0.8)
MONOCYTES NFR BLD AUTO: 4.6 % (ref 2–6)
MONOCYTES NFR BLD MANUAL: 4 % (ref 2–6)
MPC BLD CALC-MCNC: 9.8 FL (ref 9.4–12.4)
NEUTROPHILS # BLD AUTO: 6.05 K/UL (ref 1.8–7.7)
NEUTROPHILS NFR BLD AUTO: 10 % (ref 53–65)
NEUTS BAND NFR BLD MANUAL: 3 % (ref 1–5)
NEUTS SEG NFR BLD MANUAL: 12 % (ref 50–62)
NITRITE UR QL STRIP: NEGATIVE
NONHDLC SERPL-MCNC: 77 MG/DL
NRBC # BLD AUTO: 0 X10E3/UL
NRBC, AUTO (.00): 0 %
PH UR STRIP: 6.5 PH UNITS
PLATELET # BLD AUTO: 272 K/UL (ref 150–400)
PLATELET MORPHOLOGY: NORMAL
POTASSIUM SERPL-SCNC: 4.7 MMOL/L (ref 3.5–5.1)
PROT UR QL STRIP: NEGATIVE
PSA SERPL-MCNC: 2.3 NG/ML
RBC # BLD AUTO: 4.44 M/UL (ref 4.6–6.2)
RBC # UR STRIP: NEGATIVE /UL
RBC MORPH BLD: NORMAL
SMUDGE CELLS BLD QL SMEAR: ABNORMAL
SODIUM SERPL-SCNC: 132 MMOL/L (ref 136–145)
SP GR UR STRIP: 1.01
TRIGL SERPL-MCNC: 254 MG/DL (ref 35–150)
UROBILINOGEN UR STRIP-ACNC: NORMAL MG/DL
VLDLC SERPL-MCNC: 51 MG/DL
WBC # BLD AUTO: 59.59 K/UL (ref 4.5–11)

## 2023-11-02 PROCEDURE — 82570 MICROALBUMIN / CREATININE RATIO URINE: ICD-10-PCS | Mod: ,,, | Performed by: CLINICAL MEDICAL LABORATORY

## 2023-11-02 PROCEDURE — 82043 UR ALBUMIN QUANTITATIVE: CPT | Mod: ,,, | Performed by: CLINICAL MEDICAL LABORATORY

## 2023-11-02 PROCEDURE — 85025 COMPLETE CBC W/AUTO DIFF WBC: CPT | Mod: ,,, | Performed by: CLINICAL MEDICAL LABORATORY

## 2023-11-02 PROCEDURE — 80048 BASIC METABOLIC PANEL: ICD-10-PCS | Mod: ,,, | Performed by: CLINICAL MEDICAL LABORATORY

## 2023-11-02 PROCEDURE — 99214 OFFICE O/P EST MOD 30 MIN: CPT | Mod: ,,, | Performed by: FAMILY MEDICINE

## 2023-11-02 PROCEDURE — 80061 LIPID PANEL: CPT | Mod: ,,, | Performed by: CLINICAL MEDICAL LABORATORY

## 2023-11-02 PROCEDURE — 80061 LIPID PANEL: ICD-10-PCS | Mod: ,,, | Performed by: CLINICAL MEDICAL LABORATORY

## 2023-11-02 PROCEDURE — 85025 CBC WITH DIFFERENTIAL: ICD-10-PCS | Mod: ,,, | Performed by: CLINICAL MEDICAL LABORATORY

## 2023-11-02 PROCEDURE — G0103 PSA, SCREENING: ICD-10-PCS | Mod: ,,, | Performed by: CLINICAL MEDICAL LABORATORY

## 2023-11-02 PROCEDURE — 81003 URINALYSIS, REFLEX TO URINE CULTURE: ICD-10-PCS | Mod: QW,,, | Performed by: CLINICAL MEDICAL LABORATORY

## 2023-11-02 PROCEDURE — 82043 MICROALBUMIN / CREATININE RATIO URINE: ICD-10-PCS | Mod: ,,, | Performed by: CLINICAL MEDICAL LABORATORY

## 2023-11-02 PROCEDURE — 81003 URINALYSIS AUTO W/O SCOPE: CPT | Mod: QW,,, | Performed by: CLINICAL MEDICAL LABORATORY

## 2023-11-02 PROCEDURE — 82570 ASSAY OF URINE CREATININE: CPT | Mod: ,,, | Performed by: CLINICAL MEDICAL LABORATORY

## 2023-11-02 PROCEDURE — G0103 PSA SCREENING: HCPCS | Mod: ,,, | Performed by: CLINICAL MEDICAL LABORATORY

## 2023-11-02 PROCEDURE — 80048 BASIC METABOLIC PNL TOTAL CA: CPT | Mod: ,,, | Performed by: CLINICAL MEDICAL LABORATORY

## 2023-11-02 PROCEDURE — 99214 PR OFFICE/OUTPT VISIT, EST, LEVL IV, 30-39 MIN: ICD-10-PCS | Mod: ,,, | Performed by: FAMILY MEDICINE

## 2023-11-02 RX ORDER — TAMSULOSIN HYDROCHLORIDE 0.4 MG/1
0.4 CAPSULE ORAL DAILY
Qty: 90 CAPSULE | Refills: 1 | Status: SHIPPED | OUTPATIENT
Start: 2023-11-02

## 2023-11-02 NOTE — PROGRESS NOTES
John Victor MD    [address] [place] [address]     PATIENT NAME: Vaibhav Tong  : 1940  DATE: 23  MRN: [id number]      Billing Provider: John Victor MD  Level of Service: [place] OFFICE/OUTPT VISIT, EST, LEVL IV, 30-39 MIN  Patient PCP Information       Provider PCP Type    John Victor MD General            Reason for Visit / Chief Complaint: Hypertension, Hyperlipidemia (Medication refills), and Benign Prostatic Hypertrophy (Dr. Gen Lee in [place] is his Urologist he has been seeing for years. His PSA level has been normal for the past 2 years so Dr. Lee asked if his PCP could manage medication and do PSA labs. If his labs become high again he can go back to Dr. Lee to manage at that time.)       Update PCP  Update Chief Complaint         History of Present Illness / Problem Focused Workflow     Vaibhav Tong presents to the clinic with Hypertension, Hyperlipidemia (Medication refills), and Benign Prostatic Hypertrophy (Dr. Gen Lee in [place] is his Urologist he has been seeing for years. His PSA level has been normal for the past 2 years so Dr. Lee asked if his PCP could manage medication and do PSA labs. If his labs become high again he can go back to Dr. Lee to manage at that time.)     We will check his PSA over the next few years and if there are concerns we will refer him back to Dr. Lee    He has been doing well recently. Has not had many problems with his heart lately. At one point he was having a lot of afib problems       HPI    Review of Systems     Review of Systems   Constitutional:  Negative for activity change, appetite change, chills, fatigue and fever.   HENT:  Negative for nasal congestion, ear pain, hearing loss, postnasal drip and sore throat.    Respiratory:  Negative for cough, chest tightness, shortness of breath and wheezing.    Cardiovascular:  Negative for chest pain, palpitations,  leg swelling and claudication.   Gastrointestinal:  Negative for abdominal pain, change in bowel habit, constipation, diarrhea, nausea and vomiting.   Genitourinary:  Negative for dysuria.   Musculoskeletal:  Negative for arthralgias, back pain, gait problem and myalgias.   Integumentary:  Negative for rash.   Neurological:  Negative for weakness and headaches.   Psychiatric/Behavioral:  Negative for suicidal ideas. The patient is not nervous/anxious.         Medical / Social / Family History     Past Medical History:   Diagnosis Date    Atrial fibrillation     BPH (benign prostatic hyperplasia)     Chronic lymphocytic leukemia     GERD (gastroesophageal reflux disease)     Hyperlipidemia     Hypertension     Myocardial infarction     Pulmonary fibrosis     Sleep apnea, unspecified        Past Surgical History:   Procedure Laterality Date    abaltion      APPENDECTOMY         Social History    reports that he quit smoking about 53 years ago. His smoking use included cigarettes. He started smoking about 65 years ago. He has a 12.0 pack-year smoking history. He has been exposed to tobacco smoke. He has never used smokeless tobacco. He reports that he does not drink alcohol and does not use drugs.    Family History  's family history includes Heart disease in his brother and sister; Hypertension in his mother and sister; No Known Problems in his father.    Medications and Allergies     Medications  Outpatient Medications Marked as Taking for the 11/2/23 encounter (Office Visit) with John Victor MD   Medication Sig Dispense Refill    aspirin (ECOTRIN) 81 MG EC tablet Take 81 mg by mouth.      atorvastatin (LIPITOR) 40 MG tablet Take 1 tablet (40 mg total) by mouth nightly. 90 tablet 1    clopidogreL (PLAVIX) 75 mg tablet Take 75 mg by mouth once daily.      dofetilide (TIKOSYN) 250 MCG Cap Take 1 capsule by mouth 2 (two) times a day.      finasteride (PROSCAR) 5 mg tablet Take 5 mg by mouth nightly.        "hydrALAZINE (APRESOLINE) 50 MG tablet       irbesartan (AVAPRO) 300 MG tablet Take 150 mg by mouth once daily.      metoprolol succinate (TOPROL-XL) 25 MG 24 hr tablet Take 25 mg by mouth 2 (two) times daily.      multivitamin (THERAGRAN) per tablet Take 1 tablet by mouth once daily.      pantoprazole (PROTONIX) 40 MG tablet Take 1 tablet (40 mg total) by mouth every morning. For REFLUX 90 tablet 1    spironolactone (ALDACTONE) 25 MG tablet Take 25 mg by mouth once daily.      [DISCONTINUED] tamsulosin (FLOMAX) 0.4 mg Cap Take by mouth once daily.         Allergies  Review of patient's allergies indicates:   Allergen Reactions    Ace inhibitors Other (See Comments)     cough    Albuterol Other (See Comments)     "Extremely light headed and dizzy"    Codeine Other (See Comments)     "Makes me feel crazy and keeps me awake."       Physical Examination     Vitals:    11/02/23 0922   BP: 130/70   Pulse: 67   Resp: 16   Temp: 97.4 °F (36.3 °C)     Physical Exam  Vitals and nursing note reviewed.   Constitutional:       General: He is not in acute distress.     Appearance: Normal appearance. He is not ill-appearing.   Eyes:      Extraocular Movements: Extraocular movements intact.      Pupils: Pupils are equal, round, and reactive to light.   Cardiovascular:      Rate and Rhythm: Normal rate and regular rhythm.      Heart sounds: Normal heart sounds.   Pulmonary:      Effort: Pulmonary effort is normal.      Breath sounds: Normal breath sounds.   Abdominal:      General: Bowel sounds are normal.      Palpations: Abdomen is soft.   Musculoskeletal:         General: Normal range of motion.   Skin:     Findings: No rash.   Neurological:      General: No focal deficit present.      Mental Status: He is alert and oriented to person, place, and time. Mental status is at baseline.   Psychiatric:         Mood and Affect: Mood normal.         Behavior: Behavior normal.            Assessment and Plan (including Health Maintenance) "      Problem List  Smart Sets  Document Outside HM   :    Plan:     Labs today with medication refills. He is no longer seeing Dr. Lee unless he needs him. He does continue to see Cardiology at [facility], Dr. Garg and Dr. Metcalf     Health Maintenance Due   Topic Date Due    TETANUS VACCINE  Never done    RSV Vaccine (Age 60+) (1 - 1-dose 60+ series) Never done    Shingles Vaccine (1 of 2) 03/30/2012    Influenza Vaccine (1) 09/01/2023    COVID-19 Vaccine (4 - 2023-24 season) 09/01/2023       Problem List Items Addressed This Visit          Cardiac/Vascular    Primary hypertension - Primary (Chronic)    Overview      - Goal blood pressure for most patients is less than 130/85. Both numbers are important. If you have questions about your specific goal blood pressure, please ask at your clinic visits.   - Check blood pressure outside of clinic, record the numbers, and bring the log to all your office visits.   - If you have any chest pain, SOB, or other concerning symptoms, report these immediately, or go to the nearest ER.    - Recommend cardiovascular exercise, at least 3 times per week, for at least 15 minutes.   - Eat a heart healthy diet.            Relevant Orders    Basic Metabolic Panel    CBC Auto Differential    Microalbumin/Creatinine Ratio, Urine    Urinalysis, Reflex to Urine Culture    Mixed hyperlipidemia (Chronic)    Relevant Orders    Lipid Panel    Dilated cardiomyopathy       Renal/    Benign prostatic hyperplasia (Chronic)    Relevant Medications    tamsulosin (FLOMAX) 0.4 mg Cap    Other Relevant Orders    PSA, Screening     Other Visit Diagnoses       Screening for prostate cancer        Relevant Orders    PSA, Screening            Health Maintenance Topics with due status: Not Due       Topic Last Completion Date    PROSTATE-SPECIFIC ANTIGEN 03/22/2023    Lipid Panel 05/01/2023    Eye Exam 08/15/2023    Aspirin/Antiplatelet Therapy 11/02/2023       Procedures     Future Appointments    Date Time Provider Department Center   4/24/2024 10:30 AM Tera Jones DO [facility] [place]   5/17/2024 10:00 AM AWV NURSE, [facility] FAMILY MEDICINE [facility] [place] [place]   7/9/2024  1:00 PM [facility]    7/9/2024  2:00 PM Christiana Bhakta, MALACHIP [facility]        Follow up in about 6 months (around 5/2/2024) for chronic health problems, hypertension.     Signature:  John Victor MD  [address] [place] [address]  Phone #: [phone]  Fax #: [phone]    Date of encounter: 11/2/23    There are no Patient Instructions on file for this visit.

## 2023-11-09 DIAGNOSIS — Z71.89 COMPLEX CARE COORDINATION: ICD-10-CM

## 2024-02-05 ENCOUNTER — TELEPHONE (OUTPATIENT)
Dept: FAMILY MEDICINE | Facility: CLINIC | Age: 84
End: 2024-02-05
Payer: MEDICARE

## 2024-02-05 ENCOUNTER — HOSPITAL ENCOUNTER (OUTPATIENT)
Dept: RADIOLOGY | Facility: HOSPITAL | Age: 84
Discharge: HOME | End: 2024-02-05
Attending: FAMILY MEDICINE
Payer: MEDICARE

## 2024-02-05 ENCOUNTER — OFFICE VISIT (OUTPATIENT)
Dept: FAMILY MEDICINE | Facility: CLINIC | Age: 84
End: 2024-02-05
Payer: MEDICARE

## 2024-02-05 VITALS
RESPIRATION RATE: 16 BRPM | WEIGHT: 196.63 LBS | DIASTOLIC BLOOD PRESSURE: 67 MMHG | BODY MASS INDEX: 28.15 KG/M2 | TEMPERATURE: 98 F | HEIGHT: 70 IN | SYSTOLIC BLOOD PRESSURE: 135 MMHG | OXYGEN SATURATION: 96 % | HEART RATE: 69 BPM

## 2024-02-05 DIAGNOSIS — M54.42 ACUTE BILATERAL LOW BACK PAIN WITH BILATERAL SCIATICA: ICD-10-CM

## 2024-02-05 DIAGNOSIS — M54.41 ACUTE BILATERAL LOW BACK PAIN WITH BILATERAL SCIATICA: ICD-10-CM

## 2024-02-05 DIAGNOSIS — M54.42 ACUTE BILATERAL LOW BACK PAIN WITH BILATERAL SCIATICA: Primary | ICD-10-CM

## 2024-02-05 DIAGNOSIS — M54.41 ACUTE BILATERAL LOW BACK PAIN WITH BILATERAL SCIATICA: Primary | ICD-10-CM

## 2024-02-05 PROCEDURE — 99213 OFFICE O/P EST LOW 20 MIN: CPT | Mod: ,,, | Performed by: FAMILY MEDICINE

## 2024-02-05 PROCEDURE — 72110 X-RAY EXAM L-2 SPINE 4/>VWS: CPT | Mod: TC,PN

## 2024-02-05 RX ORDER — MELOXICAM 15 MG/1
15 TABLET ORAL DAILY
Qty: 7 TABLET | Refills: 0 | Status: SHIPPED | OUTPATIENT
Start: 2024-02-05 | End: 2024-04-04

## 2024-02-05 RX ORDER — PREDNISONE 20 MG/1
20 TABLET ORAL 2 TIMES DAILY
Qty: 14 TABLET | Refills: 0 | Status: SHIPPED | OUTPATIENT
Start: 2024-02-05 | End: 2024-04-04

## 2024-02-05 RX ORDER — METHOCARBAMOL 500 MG/1
500 TABLET, FILM COATED ORAL 4 TIMES DAILY PRN
Qty: 40 TABLET | Refills: 0 | Status: SHIPPED | OUTPATIENT
Start: 2024-02-05 | End: 2024-02-15

## 2024-02-05 NOTE — PROGRESS NOTES
John Victor MD    [address] [place] [address]     PATIENT NAME: Vaibhav Tong  : 1940  DATE: 24  MRN: [id number]      Billing Provider: John Victor MD  Level of Service: [place] OFFICE/OUTPT VISIT, EST, LEVL III, 20-29 MIN  Patient PCP Information       Provider PCP Type    John Victor MD General            Reason for Visit / Chief Complaint: Back Pain (Lower back pain radiates to hips and down both legs. He states pain started on Friday. )       Update PCP  Update Chief Complaint         History of Present Illness / Problem Focused Workflow     Vaibhav Tong presents to the clinic with Back Pain (Lower back pain radiates to hips and down both legs. He states pain started on Friday. )     This pain is new, he has a recurrent history of low back pain, but this is a new type of pain     HPI    Review of Systems     Review of Systems   Constitutional:  Negative for activity change, appetite change, chills, fatigue and fever.   HENT:  Negative for nasal congestion, ear pain, hearing loss, postnasal drip and sore throat.    Respiratory:  Negative for cough, chest tightness, shortness of breath and wheezing.    Cardiovascular:  Negative for chest pain, palpitations, leg swelling and claudication.   Gastrointestinal:  Negative for abdominal pain, change in bowel habit, constipation, diarrhea, nausea and vomiting.   Genitourinary:  Negative for dysuria.   Musculoskeletal:  Positive for back pain and myalgias. Negative for arthralgias and gait problem.   Integumentary:  Negative for rash.   Neurological:  Negative for weakness and headaches.   Psychiatric/Behavioral:  Negative for suicidal ideas. The patient is not nervous/anxious.         Medical / Social / Family History     Past Medical History:   Diagnosis Date    Atrial fibrillation     BPH (benign prostatic hyperplasia)     Chronic lymphocytic leukemia     GERD (gastroesophageal reflux  disease)     Hyperlipidemia     Hypertension     Myocardial infarction     Pulmonary fibrosis     Sleep apnea, unspecified        Past Surgical History:   Procedure Laterality Date    abaltion      APPENDECTOMY         Social History    reports that he quit smoking about 54 years ago. His smoking use included cigarettes. He started smoking about 66 years ago. He has a 12.0 pack-year smoking history. He has been exposed to tobacco smoke. He has never used smokeless tobacco. He reports that he does not drink alcohol and does not use drugs.    Family History  's family history includes Heart disease in his brother and sister; Hypertension in his mother and sister; No Known Problems in his father.    Medications and Allergies     Medications  Outpatient Medications Marked as Taking for the 2/5/24 encounter (Office Visit) with John Victor MD   Medication Sig Dispense Refill    aspirin (ECOTRIN) 81 MG EC tablet Take 81 mg by mouth.      atorvastatin (LIPITOR) 40 MG tablet Take 1 tablet (40 mg total) by mouth nightly. 90 tablet 1    clopidogreL (PLAVIX) 75 mg tablet Take 75 mg by mouth once daily.      dofetilide (TIKOSYN) 250 MCG Cap Take 1 capsule by mouth 2 (two) times a day.      finasteride (PROSCAR) 5 mg tablet Take 5 mg by mouth nightly.       hydrALAZINE (APRESOLINE) 50 MG tablet       irbesartan (AVAPRO) 300 MG tablet Take 150 mg by mouth once daily.      metoprolol succinate (TOPROL-XL) 25 MG 24 hr tablet Take 25 mg by mouth 2 (two) times daily.      multivitamin (THERAGRAN) per tablet Take 1 tablet by mouth once daily.      pantoprazole (PROTONIX) 40 MG tablet Take 1 tablet (40 mg total) by mouth every morning. For REFLUX 90 tablet 1    spironolactone (ALDACTONE) 25 MG tablet Take 25 mg by mouth once daily.      tamsulosin (FLOMAX) 0.4 mg Cap Take 1 capsule (0.4 mg total) by mouth once daily. For URINE FLOW 90 capsule 1       Allergies  Review of patient's allergies indicates:   Allergen Reactions  "   Ace inhibitors Other (See Comments)     cough    Albuterol Other (See Comments)     "Extremely light headed and dizzy"    Codeine Other (See Comments)     "Makes me feel crazy and keeps me awake."       Physical Examination     Vitals:    02/05/24 1254   BP: 135/67   Pulse: 69   Resp: 16   Temp: 97.5 °F (36.4 °C)     Physical Exam  Vitals and nursing note reviewed.   Constitutional:       General: He is not in acute distress.     Appearance: Normal appearance. He is not ill-appearing.   Eyes:      Extraocular Movements: Extraocular movements intact.      Pupils: Pupils are equal, round, and reactive to light.   Cardiovascular:      Rate and Rhythm: Normal rate and regular rhythm.      Heart sounds: Normal heart sounds.   Pulmonary:      Effort: Pulmonary effort is normal.      Breath sounds: Normal breath sounds.   Abdominal:      General: Bowel sounds are normal.      Palpations: Abdomen is soft.   Musculoskeletal:      Lumbar back: No tenderness. Decreased range of motion.        Back:    Skin:     Findings: No rash.   Neurological:      General: No focal deficit present.      Mental Status: He is alert and oriented to person, place, and time. Mental status is at baseline.   Psychiatric:         Mood and Affect: Mood normal.         Behavior: Behavior normal.            Assessment and Plan (including Health Maintenance)      Problem List  Smart Sets  Document Outside HM   :    Plan:     Treated for acute lower back pain    Health Maintenance Due   Topic Date Due    TETANUS VACCINE  Never done    RSV Vaccine (Age 60+ and Pregnant patients) (1 - 1-dose 60+ series) Never done    Shingles Vaccine (1 of 2) 03/30/2012    Eye Exam  01/05/2023    COVID-19 Vaccine (4 - 2023-24 season) 09/01/2023       Problem List Items Addressed This Visit    None  Visit Diagnoses       Acute bilateral low back pain with bilateral sciatica    -  Primary    Relevant Medications    predniSONE (DELTASONE) 20 MG tablet    meloxicam (MOBIC) " 15 MG tablet    methocarbamoL (ROBAXIN) 500 MG Tab    Other Relevant Orders    X-Ray Lumbar Spine 5 View (Completed)            Health Maintenance Topics with due status: Not Due       Topic Last Completion Date    PROSTATE-SPECIFIC ANTIGEN 11/02/2023    Lipid Panel 11/02/2023    Aspirin/Antiplatelet Therapy 02/05/2024       Procedures     Future Appointments   Date Time Provider Department Center   5/17/2024 10:00 AM AWV NURSE, [facility] FAMILY MEDICINE [facility] FAM[facility] [place]   7/9/2024  1:[facility]   7/9/2024  2:00 PM Christiana Bhakta, ACNP [facility]        Follow up if symptoms worsen or fail to improve.     Signature:  John Victor MD  [address] [place] [address]  Phone #: [phone]  Fax #: [phone]    Date of encounter: 2/5/24    There are no Patient Instructions on file for this visit.

## 2024-02-06 ENCOUNTER — TELEPHONE (OUTPATIENT)
Dept: FAMILY MEDICINE | Facility: CLINIC | Age: 84
End: 2024-02-06
Payer: MEDICARE

## 2024-02-06 DIAGNOSIS — M54.50 ACUTE LOW BACK PAIN WITHOUT SCIATICA, UNSPECIFIED BACK PAIN LATERALITY: Primary | ICD-10-CM

## 2024-02-06 NOTE — TELEPHONE ENCOUNTER
----- Message from John Victor MD sent at 2/5/2024  4:58 PM CST -----  He has a compression fracture at L1, is this new for him?    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 Hopefully we are able to calm down the inflammation associated with this, if not, he is looking at needing PT and then likely an MRI, and then surgery referral.

## 2024-02-27 DIAGNOSIS — E78.2 MIXED HYPERLIPIDEMIA: Chronic | ICD-10-CM

## 2024-02-27 RX ORDER — ATORVASTATIN CALCIUM 40 MG/1
40 TABLET, FILM COATED ORAL NIGHTLY
Qty: 90 TABLET | Refills: 1 | Status: SHIPPED | OUTPATIENT
Start: 2024-02-27

## 2024-02-27 NOTE — TELEPHONE ENCOUNTER
----- Message from Sariah De La Garza sent at 2/27/2024  8:36 AM CST -----  Regarding: Med refill  Patient, Vaibhav Tong, called asking if we can refill one of his medicines. He has an appointment in May if it can be wrote to get him to that appt. He is leaving a message on the voicemail also. If I need to make him a sooner appointment, please let me know. Thank you. The medicine he is needing is:  atorvastatin (LIPITOR) 40 MG tablet

## 2024-03-05 DIAGNOSIS — N40.1 BENIGN PROSTATIC HYPERPLASIA WITH URINARY FREQUENCY: Primary | Chronic | ICD-10-CM

## 2024-03-05 DIAGNOSIS — R35.0 BENIGN PROSTATIC HYPERPLASIA WITH URINARY FREQUENCY: Primary | Chronic | ICD-10-CM

## 2024-03-05 RX ORDER — FINASTERIDE 5 MG/1
5 TABLET, FILM COATED ORAL NIGHTLY
Qty: 90 TABLET | Refills: 1 | Status: SHIPPED | OUTPATIENT
Start: 2024-03-05

## 2024-03-05 NOTE — TELEPHONE ENCOUNTER
----- Message from Eileen Plunkett sent at 3/5/2024 11:43 AM CST -----  Patient would like a 90 day supply of finasteride (PROSCAR) 5 mg tablet sent to [facility]. Good call back is [phone]

## 2024-03-12 ENCOUNTER — TELEPHONE (OUTPATIENT)
Dept: FAMILY MEDICINE | Facility: CLINIC | Age: 84
End: 2024-03-12
Payer: MEDICARE

## 2024-03-12 DIAGNOSIS — M54.16 LUMBAR RADICULOPATHY, CHRONIC: Primary | ICD-10-CM

## 2024-03-12 NOTE — TELEPHONE ENCOUNTER
----- Message from Deann Irizarry LPN sent at 3/12/2024  1:27 PM CDT -----    ----- Message -----  From: Jina Enriquez  Sent: 3/12/2024   1:03 PM CDT  To: Val[facility] SHEPARD Staff    Pt is done with physical therapy and is needing to schedule a MRI. Please call him back @ [phone]

## 2024-03-20 ENCOUNTER — HOSPITAL ENCOUNTER (OUTPATIENT)
Dept: RADIOLOGY | Facility: HOSPITAL | Age: 84
Discharge: HOME | End: 2024-03-20
Attending: FAMILY MEDICINE
Payer: MEDICARE

## 2024-03-20 DIAGNOSIS — M54.16 LUMBAR RADICULOPATHY, CHRONIC: ICD-10-CM

## 2024-03-20 PROCEDURE — 72148 MRI LUMBAR SPINE W/O DYE: CPT | Mod: TC

## 2024-03-21 ENCOUNTER — TELEPHONE (OUTPATIENT)
Dept: FAMILY MEDICINE | Facility: CLINIC | Age: 84
End: 2024-03-21
Payer: MEDICARE

## 2024-03-21 DIAGNOSIS — M54.16 LUMBAR RADICULOPATHY, CHRONIC: Primary | ICD-10-CM

## 2024-03-21 DIAGNOSIS — M54.50 ACUTE LOW BACK PAIN WITHOUT SCIATICA, UNSPECIFIED BACK PAIN LATERALITY: ICD-10-CM

## 2024-04-04 ENCOUNTER — OFFICE VISIT (OUTPATIENT)
Dept: FAMILY MEDICINE | Facility: CLINIC | Age: 84
End: 2024-04-04
Payer: MEDICARE

## 2024-04-04 ENCOUNTER — HOSPITAL ENCOUNTER (OUTPATIENT)
Dept: RADIOLOGY | Facility: HOSPITAL | Age: 84
Discharge: HOME | End: 2024-04-04
Attending: FAMILY MEDICINE
Payer: MEDICARE

## 2024-04-04 VITALS
HEART RATE: 60 BPM | WEIGHT: 194 LBS | TEMPERATURE: 98 F | DIASTOLIC BLOOD PRESSURE: 72 MMHG | BODY MASS INDEX: 27.77 KG/M2 | OXYGEN SATURATION: 98 % | SYSTOLIC BLOOD PRESSURE: 160 MMHG | RESPIRATION RATE: 16 BRPM | HEIGHT: 70 IN

## 2024-04-04 DIAGNOSIS — R06.02 SHORTNESS OF BREATH: Primary | ICD-10-CM

## 2024-04-04 DIAGNOSIS — D83.9 COMMON VARIABLE IMMUNODEFICIENCY, UNSPECIFIED: Chronic | ICD-10-CM

## 2024-04-04 DIAGNOSIS — C91.10 CHRONIC LYMPHOCYTIC LEUKEMIA: Chronic | ICD-10-CM

## 2024-04-04 DIAGNOSIS — J84.10 PULMONARY FIBROSIS: Chronic | ICD-10-CM

## 2024-04-04 DIAGNOSIS — F32.1 MAJOR DEPRESSIVE DISORDER, SINGLE EPISODE, MODERATE: Chronic | ICD-10-CM

## 2024-04-04 DIAGNOSIS — I70.0 AORTIC ATHEROSCLEROSIS: Chronic | ICD-10-CM

## 2024-04-04 DIAGNOSIS — D80.1 NONFAMILIAL HYPOGAMMAGLOBULINEMIA: Chronic | ICD-10-CM

## 2024-04-04 DIAGNOSIS — I42.0 DILATED CARDIOMYOPATHY: Chronic | ICD-10-CM

## 2024-04-04 DIAGNOSIS — I48.0 PAROXYSMAL ATRIAL FIBRILLATION: Chronic | ICD-10-CM

## 2024-04-04 DIAGNOSIS — R06.02 SHORTNESS OF BREATH: ICD-10-CM

## 2024-04-04 PROCEDURE — 71046 X-RAY EXAM CHEST 2 VIEWS: CPT | Mod: TC,PN

## 2024-04-04 PROCEDURE — 99214 OFFICE O/P EST MOD 30 MIN: CPT | Mod: ,,, | Performed by: FAMILY MEDICINE

## 2024-04-04 RX ORDER — SODIUM CHLORIDE FOR INHALATION 3 %
VIAL, NEBULIZER (ML) INHALATION
COMMUNITY
Start: 2024-03-26 | End: 2024-05-17

## 2024-04-04 RX ORDER — PREDNISONE 10 MG/1
TABLET ORAL
Qty: 50 TABLET | Refills: 0 | Status: SHIPPED | OUTPATIENT
Start: 2024-04-04 | End: 2024-04-24

## 2024-04-04 RX ORDER — FLUTICASONE PROPIONATE 50 MCG
1 SPRAY, SUSPENSION (ML) NASAL DAILY
COMMUNITY
Start: 2024-03-29 | End: 2024-05-17

## 2024-04-04 RX ORDER — AMOXICILLIN AND CLAVULANATE POTASSIUM 875; 125 MG/1; MG/1
1 TABLET, FILM COATED ORAL 2 TIMES DAILY
COMMUNITY
Start: 2024-03-29 | End: 2024-05-17

## 2024-04-04 NOTE — LETTER
AUTHORIZATION FOR RELEASE OF   CONFIDENTIAL INFORMATION    Dear [facility] Medical Records,    We are seeing Vaibhav Tong, date of birth 1940, in the clinic at [facility] FAMILY MEDICINE. John Victor MD is the patient's PCP. Vaibhav Tong has an outstanding lab/procedure at the time we reviewed his chart. In order to help keep his health information updated, he has authorized us to request the following medical record(s):        (  )  MAMMOGRAM                                      (  )  COLONOSCOPY      (  )  PAP SMEAR                                          (  )  OUTSIDE LAB RESULTS     (  )  DEXA SCAN                                          (  )  EYE EXAM            (  )  FOOT EXAM                                          (  )  ENTIRE RECORD     (  )  OUTSIDE IMMUNIZATIONS                 ( X )  ER Record         Please fax records to [facility], Webb, Christopher N, MD, [phone].     If you have any questions, please contact Rosa at [phone].           Patient Name: Vaibhav Tong  : 1940  Patient Phone #: [phone]

## 2024-04-04 NOTE — PROGRESS NOTES
John Victor MD    [address] [place] [address]     PATIENT NAME: Vaibhav Tong  : 1940  DATE: 24  MRN: [id number]      Billing Provider: John Victor MD  Level of Service: [place] OFFICE/OUTPT VISIT, EST, LEVL IV, 30-39 MIN  Patient PCP Information       Provider PCP Type    John Victor MD General            Reason for Visit / Chief Complaint: Sinus Problem (Patient complains of productive cough with yellow sputum, sinus drainage and congestion, and shortness of breath since last Thursday. Patient went to [facility] last Friday and was given a steroid shot and antibiotics.  Patient went to [facility]  with same symptoms. Patient was swabs for Covid, Flu, and RSV. All test came back negative. Patient still continues to have same symptoms.)       Update PCP  Update Chief Complaint         History of Present Illness / Problem Focused Workflow     Vaibhav Tong presents to the clinic with Sinus Problem (Patient complains of productive cough with yellow sputum, sinus drainage and congestion, and shortness of breath since last Thursday. Patient went to [facility] last Friday and was given a steroid shot and antibiotics.  Patient went to [facility]  with same symptoms. Patient was swabs for Covid, Flu, and RSV. All test came back negative. Patient still continues to have same symptoms.)     He has been seen twice for these symptoms in the past 6 days     He was given steroids, tessalon, and antibiotic at [facility], this was 6 days ago.     2 days later, Went to the ER, had an EKG, CXR, labs. Nothing obvious was found and his treatment regimen was not changed     Today he presents to clinic with continued shortness of breath, he has visible breathing issues when I walked in the room, however his O2 sats are normal and he appears to be in no distress,     HPI    Review of Systems     Review of Systems   Constitutional:  Negative for  activity change, appetite change, chills, fatigue and fever.   HENT:  Positive for nasal congestion, rhinorrhea and sinus pressure/congestion. Negative for ear pain, hearing loss, postnasal drip and sore throat.    Respiratory:  Positive for cough and shortness of breath. Negative for chest tightness and wheezing.    Cardiovascular:  Negative for chest pain, palpitations, leg swelling and claudication.   Gastrointestinal:  Negative for abdominal pain, change in bowel habit, constipation, diarrhea, nausea and vomiting.   Genitourinary:  Negative for dysuria.   Musculoskeletal:  Negative for arthralgias, back pain, gait problem and myalgias.   Integumentary:  Negative for rash.   Neurological:  Negative for weakness and headaches.   Psychiatric/Behavioral:  Negative for suicidal ideas. The patient is not nervous/anxious.         Medical / Social / Family History     Past Medical History:   Diagnosis Date    Atrial fibrillation     BPH (benign prostatic hyperplasia)     Chronic lymphocytic leukemia     GERD (gastroesophageal reflux disease)     Hyperlipidemia     Hypertension     Myocardial infarction     Pulmonary fibrosis     Sleep apnea, unspecified        Past Surgical History:   Procedure Laterality Date    abaltion      APPENDECTOMY         Social History    reports that he quit smoking about 54 years ago. His smoking use included cigarettes. He started smoking about 66 years ago. He has a 12.0 pack-year smoking history. He has been exposed to tobacco smoke. He has never used smokeless tobacco. He reports that he does not drink alcohol and does not use drugs.    Family History  's family history includes Heart disease in his brother and sister; Hypertension in his mother and sister; No Known Problems in his father.    Medications and Allergies     Medications  Outpatient Medications Marked as Taking for the 4/4/24 encounter (Office Visit) with John Victor MD   Medication Sig Dispense Refill     "amoxicillin-clavulanate 875-125mg (AUGMENTIN) 875-125 mg per tablet Take 1 tablet by mouth 2 (two) times daily.      aspirin (ECOTRIN) 81 MG EC tablet Take 81 mg by mouth.      atorvastatin (LIPITOR) 40 MG tablet Take 1 tablet (40 mg total) by mouth nightly. For CHOLESTEROL 90 tablet 1    clopidogreL (PLAVIX) 75 mg tablet Take 75 mg by mouth once daily.      dofetilide (TIKOSYN) 250 MCG Cap Take 1 capsule by mouth 2 (two) times a day.      finasteride (PROSCAR) 5 mg tablet Take 1 tablet (5 mg total) by mouth nightly. 90 tablet 1    fluticasone propionate (FLONASE) 50 mcg/actuation nasal spray 1 spray by Each Nostril route once daily.      hydrALAZINE (APRESOLINE) 50 MG tablet       irbesartan (AVAPRO) 300 MG tablet Take 150 mg by mouth once daily.      metoprolol succinate (TOPROL-XL) 25 MG 24 hr tablet Take 25 mg by mouth 2 (two) times daily.      multivitamin (THERAGRAN) per tablet Take 1 tablet by mouth once daily.      pantoprazole (PROTONIX) 40 MG tablet Take 1 tablet (40 mg total) by mouth every morning. For REFLUX 90 tablet 1    sodium chloride 3% 3 % nebulizer solution use as directed      spironolactone (ALDACTONE) 25 MG tablet Take 25 mg by mouth once daily.      tamsulosin (FLOMAX) 0.4 mg Cap Take 1 capsule (0.4 mg total) by mouth once daily. For URINE FLOW 90 capsule 1    [DISCONTINUED] predniSONE (DELTASONE) 20 MG tablet Take 1 tablet (20 mg total) by mouth 2 (two) times daily. 14 tablet 0       Allergies  Review of patient's allergies indicates:   Allergen Reactions    Ace inhibitors Other (See Comments)     cough    Albuterol Other (See Comments)     "Extremely light headed and dizzy"    Codeine Other (See Comments)     "Makes me feel crazy and keeps me awake."       Physical Examination     Vitals:    04/04/24 1438   BP: (!) 160/72   Pulse: 60   Resp: 16   Temp: 97.9 °F (36.6 °C)     Physical Exam  Vitals and nursing note reviewed.   Constitutional:       General: He is not in acute distress.     " Appearance: Normal appearance. He is not ill-appearing.   Eyes:      Extraocular Movements: Extraocular movements intact.      Pupils: Pupils are equal, round, and reactive to light.   Cardiovascular:      Rate and Rhythm: Normal rate and regular rhythm.      Heart sounds: Normal heart sounds.   Pulmonary:      Effort: Pulmonary effort is normal.      Breath sounds: Normal breath sounds.   Abdominal:      General: Bowel sounds are normal.      Palpations: Abdomen is soft.   Musculoskeletal:         General: Normal range of motion.   Skin:     Findings: No rash.   Neurological:      General: No focal deficit present.      Mental Status: He is alert and oriented to person, place, and time. Mental status is at baseline.   Psychiatric:         Mood and Affect: Mood normal.         Behavior: Behavior normal.            Assessment and Plan (including Health Maintenance)      Problem List  Smart Sets  Document Outside HM   :    Plan:     I have my concerns about his condition, but I do not see where he meets any hospital admission criteria. O2 sats are 98% on RA at rest, he is comfortable at rest. CXR shows no overt pneumonia.     Steroid taper and him to monitor his condition at home. He could let his Cardiologist know his heart Rhythmin seems to be a bit off at this time.         Health Maintenance Due   Topic Date Due    TETANUS VACCINE  Never done    RSV Vaccine (Age 60+ and Pregnant patients) (1 - 1-dose 60+ series) Never done    Shingles Vaccine (1 of 2) 03/30/2012    COVID-19 Vaccine (3 - Moderna risk series) 11/30/2021    Eye Exam  01/05/2023       Problem List Items Addressed This Visit          Psychiatric    Major depressive disorder, single episode, moderate       Pulmonary    Pulmonary fibrosis (Chronic)       Cardiac/Vascular    Paroxysmal atrial fibrillation (Chronic)    Aortic atherosclerosis (Chronic)    Dilated cardiomyopathy       Immunology/Multi System    Common variable immunodeficiency, unspecified     Nonfamilial hypogammaglobulinemia       Oncology    Chronic lymphocytic leukemia (Chronic)    Overview     dr annette mitchell oncologist  dr annette mitchell oncologist          Other Visit Diagnoses       Shortness of breath    -  Primary    Relevant Medications    predniSONE (DELTASONE) 10 MG tablet    Other Relevant Orders    X-Ray Chest PA And Lateral (Completed)            Health Maintenance Topics with due status: Not Due       Topic Last Completion Date    PROSTATE-SPECIFIC ANTIGEN 11/02/2023    Lipid Panel 11/02/2023    Aspirin/Antiplatelet Therapy 04/04/2024       Procedures     Future Appointments   Date Time Provider Department Center   4/10/2024  9:00 AM Tessie Canada, NP OB SPINE [facility]   5/17/2024 10:00 AM AWV NURSE, [facility]   7/9/2024  1:00 PM [facility] VASC US1 OB VASCUS [facility]   7/9/2024  2:00 PM Christiana Bhakta, MALACHIP [facility] VSCSRG [facility]        Follow up if symptoms worsen or fail to improve.     Signature:  John Victor MD  [address] [place] [address]  Phone #: [phone]  Fax #: [phone]    Date of encounter: 4/4/24    There are no Patient Instructions on file for this visit.

## 2024-04-04 NOTE — LETTER
AUTHORIZATION FOR RELEASE OF   CONFIDENTIAL INFORMATION    Dear [facility],    We are seeing Vaibhav Tong, date of birth 1940, in the clinic at [facility]. John Victor MD is the patient's PCP. Vaibhav Tong has an outstanding lab/procedure at the time we reviewed his chart. In order to help keep his health information updated, he has authorized us to request the following medical record(s):        (  )  MAMMOGRAM                                      (  )  COLONOSCOPY      (  )  PAP SMEAR                                          (  )  OUTSIDE LAB RESULTS     (  )  DEXA SCAN                                          (  )  EYE EXAM            (  )  FOOT EXAM                                          ( X )  ENTIRE RECORD     (  )  OUTSIDE IMMUNIZATIONS                 ( )  _______________         Please fax records to [facility], Webb, Christopher N, MD, [phone].     If you have any questions, please contact Rosa at [phone].           Patient Name: Vaibhav Tong  : 1940  Patient Phone #: [phone]

## 2024-04-08 PROBLEM — D83.9 COMMON VARIABLE IMMUNODEFICIENCY, UNSPECIFIED: Status: ACTIVE | Noted: 2024-04-08

## 2024-04-08 PROBLEM — D80.1 NONFAMILIAL HYPOGAMMAGLOBULINEMIA: Status: ACTIVE | Noted: 2024-04-08

## 2024-04-09 DIAGNOSIS — M54.16 LUMBAR RADICULOPATHY: Primary | ICD-10-CM

## 2024-04-10 ENCOUNTER — HOSPITAL ENCOUNTER (OUTPATIENT)
Dept: RADIOLOGY | Facility: HOSPITAL | Age: 84
Discharge: HOME | End: 2024-04-10
Attending: NURSE PRACTITIONER
Payer: MEDICARE

## 2024-04-10 ENCOUNTER — OFFICE VISIT (OUTPATIENT)
Dept: SPINE | Facility: CLINIC | Age: 84
End: 2024-04-10
Payer: MEDICARE

## 2024-04-10 DIAGNOSIS — M54.50 ACUTE LOW BACK PAIN WITHOUT SCIATICA, UNSPECIFIED BACK PAIN LATERALITY: ICD-10-CM

## 2024-04-10 DIAGNOSIS — M54.16 LUMBAR RADICULOPATHY, CHRONIC: ICD-10-CM

## 2024-04-10 DIAGNOSIS — M54.16 LUMBAR RADICULOPATHY: ICD-10-CM

## 2024-04-10 PROCEDURE — 99213 OFFICE O/P EST LOW 20 MIN: CPT | Mod: PBBFAC,25 | Performed by: NURSE PRACTITIONER

## 2024-04-10 PROCEDURE — 72110 X-RAY EXAM L-2 SPINE 4/>VWS: CPT | Mod: 26,,, | Performed by: RADIOLOGY

## 2024-04-10 PROCEDURE — 72110 X-RAY EXAM L-2 SPINE 4/>VWS: CPT | Mod: TC

## 2024-04-10 PROCEDURE — 99215 OFFICE O/P EST HI 40 MIN: CPT | Mod: S$PBB,,, | Performed by: NURSE PRACTITIONER

## 2024-04-10 NOTE — PROGRESS NOTES
MDM/time:  Greater than 45 minutes spent on this encounter including 15 minutes reviewing imaging and notes, 20 minutes with the patient, 10 minutes documentation    ASSESSMENT:  84 y.o. male with lumbar spondylosis with radiculopathy     PLAN:  LSO brace   If symptoms persist we discussed pain management epidural injections patient will call if he would like a referral  Follow up 3 months     HPI:  84 y.o. male here for evaluation of patient reports back pain started January 2024 difficulty with pain when he bends.  He was seen by his primary care provider and completed 4 weeks of physical therapy.  Patient denies difficulty with  strength.  No issues with balance or falls.  Denies bladder bowel incontinence.  Decreased walking tolerance due to shortness of breath and pain.  He is currently being treated for a sinus infection has been taking prednisone he reports since he has been on this medication his pain has improved greatly.  Currently takes Tylenol as needed for pain.  No recent pain management.  Recent MRI at [facility] in [place].  Patient is not a smoker.  He currently has a history of chronic leukemia sees Dr. Marroquin oncology.      IMAGING:  X-Ray Lumbar 4-5 View including Bending Views  Narrative: EXAMINATION:  XR LUMBAR SPINE 4-5 VIEW WITH BENDING VIEWS    CLINICAL HISTORY:  Radiculopathy, lumbar region    COMPARISON:  Lumbar spine x-ray February 5, 2024    TECHNIQUE:  Frontal and lateral views of the lumbosacral spine. Lateral views obtained in the neutral, flexion, and extension positions.    FINDINGS:  Mild S-shaped scoliotic curvature of the spine.  Multilevel mild loss of disc space height and mild marginal vertebral body osteophyte formation.  Scattered posterior facet arthropathy.  No significant anterolisthesis or retrolisthesis.  Lumbar vertebral body heights appear maintained.  Atherosclerotic calcification demonstrated.  Impression: Degenerative change of the lumbar spine as  detailed.    Point of Service: [facility]    Electronically signed by: Keith Duarte  Date:    04/10/2024  Time:    08:34       Narrative & Impression  EXAMINATION:  MRI LUMBAR SPINE WITHOUT CONTRAST     CLINICAL HISTORY:  Radiculopathy, lumbar regionLumbar radiculopathy, symptoms persist with conservative treatment;     COMPARISON:  None     TECHNIQUE:  Multiplanar MRI imaging of the lumbar spine was performed without the use of intravenous contrast.     FINDINGS:  Lumbar vertebral body heights and alignment appear maintained.  Multilevel disc desiccation.  Multilevel mild marginal vertebral body osteophyte formation.  Prominence of bilateral common iliac lymph nodes with representative on the left measuring up to 1.3 cm in short axis dimension.  Lymphoma not excluded.     Modic endplate changes: No significant Modic type endplate changes.     Intervertebral disc space levels:     L1-L2: Diffuse disc bulge with posterior facet and ligamentum flavum hypertrophy. Mild spinal canal narrowing. Mild right and mild left neuroforaminal narrowing.     L2-L3: Diffuse disc bulge with posterior facet and ligamentum flavum hypertrophy. Mild spinal canal narrowing. Mild right and mild left neuroforaminal narrowing.     L3-L4: Diffuse disc bulge with posterior facet and ligamentum flavum hypertrophy. Mild spinal canal narrowing. Mild right and mild left neuroforaminal narrowing.     L4-L5: Moderate loss of disc space height.  Diffuse disc bulge with posterior facet and ligamentum flavum hypertrophy. Mild-to-moderate spinal canal narrowing. Moderate right and mild left neuroforaminal narrowing.     L5-S1: Diffuse disc bulge with posterior facet and ligamentum flavum hypertrophy. Mild spinal canal narrowing. Mild right and mild left neuroforaminal narrowing.     Impression:     Degenerative change of the lumbar spine with spinal canal and neuroforaminal narrowing as detailed above.  Findings greatest at L4-5.      Prominence of bilateral common iliac lymph nodes with representative on the left measuring up to 1.3 cm in short axis dimension. Lymphoma not excluded.     Point of Service: [facility]        Electronically signed by: Keith Duarte  Date:                                            2024  Time:                                           09:00  Past Medical History:   Diagnosis Date    Atrial fibrillation     BPH (benign prostatic hyperplasia)     Chronic lymphocytic leukemia     GERD (gastroesophageal reflux disease)     Hyperlipidemia     Hypertension     Myocardial infarction     Pulmonary fibrosis     Sleep apnea, unspecified      Past Surgical History:   Procedure Laterality Date    abaltion      APPENDECTOMY       Social History     Tobacco Use    Smoking status: Former     Current packs/day: 0.00     Average packs/day: 1 pack/day for 12.0 years (12.0 ttl pk-yrs)     Types: Cigarettes     Start date:      Quit date:      Years since quittin.3     Passive exposure: Past    Smokeless tobacco: Never   Substance Use Topics    Alcohol use: Never    Drug use: Never      Current Outpatient Medications   Medication Instructions    amoxicillin-clavulanate 875-125mg (AUGMENTIN) 875-125 mg per tablet 1 tablet, Oral, 2 times daily    aspirin (ECOTRIN) 81 mg, Oral    atorvastatin (LIPITOR) 40 mg, Oral, Nightly, For CHOLESTEROL    clopidogreL (PLAVIX) 75 mg, Oral, Daily    dofetilide (TIKOSYN) 250 MCG Cap 1 capsule, Oral, 2 times daily    finasteride (PROSCAR) 5 mg, Oral, Nightly    fluticasone propionate (FLONASE) 50 mcg/actuation nasal spray 1 spray, Each Nostril, Daily    hydrALAZINE (APRESOLINE) 50 MG tablet No dose, route, or frequency recorded.    irbesartan (AVAPRO) 150 mg, Oral, Daily    metoprolol succinate (TOPROL-XL) 25 mg, Oral, 2 times daily    multivitamin (THERAGRAN) per tablet 1 tablet, Oral, Daily    pantoprazole (PROTONIX) 40 mg, Oral, Every morning, For REFLUX    predniSONE  (DELTASONE) 10 MG tablet Take 4 tablets (40 mg total) by mouth once daily for 5 days, THEN 3 tablets (30 mg total) once daily for 5 days, THEN 2 tablets (20 mg total) once daily for 5 days, THEN 1 tablet (10 mg total) once daily for 5 days.    sodium chloride 3% 3 % nebulizer solution use as directed    spironolactone (ALDACTONE) 25 mg, Oral, Daily    tamsulosin (FLOMAX) 0.4 mg, Oral, Daily, For URINE FLOW        EXAM:  Constitutional  General Appearance:  There is no height or weight on file to calculate BMI., NAD  Psychiatric   Orientation: Oriented to time, oriented to place, oriented to person  Mood and Affect: Active and alert, normal mood, normal affect  Gait and Station   Appearance:  Normal gait, unable to tandem gait, unable to walk on toes, unable to walk on heels    LUMBAR  Musculoskeletal System   Hips: Normal appearance, no leg length discrepancy, normal motion; left, normal motion; right    Lumbar Spine                   Inspection:  Normal alignment, normal sagittal balance                  Range of motion: decreased flexion, extension, lateral bending, rotation. Pain with range of motion                  Bony Palpation of the Lumbar Spine:  No tenderness of the spinous process, no tenderness of the sacrum, no tenderness of the coccyx                  Bony Palpation of the Right Hip:  No tenderness of the iliac crest, no tenderness of the sciatic notch, no tenderness of the SI joint                  Bony Palpation of the Left Hip:  No tenderness of the iliac crest, no tenderness of the sciatic notch, no tenderness of the SI joint                  Soft Tissue Palpation on the Right:  No tenderness of the paraspinal region, no tenderness of the iliolumbar region                  Soft Tissue Palpation on the Left:  No tenderness of the paraspinal region, no tenderness of the iliolumbar region    Motor Strength   L1 Right:  Hip flexion iliopsoas 5/5    L1 Left:  Hip flexion iliopsoas 5/5               L2-L4 Right:  Knee extension quadriceps 5/5, tibialis anterior 5/5              L2-L4 Left:  Knee extension quadriceps 5/5, tibialis anterior 5/5   L5 Right:  Extensor hallucis llongus 5/5,    L5 Left:  Extensor hallucis longus 5/5,    S1 Right:  Plantar flexion gastrocnemius 5/5   S1 Left:  Plantar flexion gastrocnemius 5/5    Neurological System   Ankle Reflex Right:  normal   Ankle Reflex Left: normal   Knee Reflex Right:  normal   Knee Reflex Left:  normal   Sensation on the Right:  L2 normal, L3 normal, L4 normal, L5 normal, S1 normal   Sensation on the Left:  L2 normal, L3 normal, L4 normal, L5 normal, S1 normal              Special Test on the Right:  Seated straight leg raising test negative, no clonus of the ankle              Special Test on the Left:  Seated straight leg raising test negative, no clonus of the ankle    Skin   Lumbosacral Spine:  Normal skin    Cardiovascular System   Arterial Pulses Right:  Posterior tibialis normal, dorsalis pedis normal   Arterial Pulses Left:  Posterior tibialis normal, dorsalis pedis normal   Edema Right: None   Edema Left:  None

## 2024-05-06 DIAGNOSIS — R35.0 BENIGN PROSTATIC HYPERPLASIA WITH URINARY FREQUENCY: Chronic | ICD-10-CM

## 2024-05-06 DIAGNOSIS — N40.1 BENIGN PROSTATIC HYPERPLASIA WITH URINARY FREQUENCY: Chronic | ICD-10-CM

## 2024-05-06 RX ORDER — TAMSULOSIN HYDROCHLORIDE 0.4 MG/1
0.4 CAPSULE ORAL DAILY
Qty: 90 CAPSULE | Refills: 1 | Status: SHIPPED | OUTPATIENT
Start: 2024-05-06

## 2024-05-06 RX ORDER — TAMSULOSIN HYDROCHLORIDE 0.4 MG/1
0.4 CAPSULE ORAL DAILY
Qty: 30 CAPSULE | Refills: 0 | Status: SHIPPED | OUTPATIENT
Start: 2024-05-06 | End: 2024-05-06

## 2024-05-16 NOTE — PROGRESS NOTES
"Vaibhav Tong presented for a  Medicare AWV and comprehensive Health Risk Assessment today. The following components were reviewed and updated:    Medical history  Family History  Social history  Allergies and Current Medications  Health Risk Assessment  Health Maintenance  Care Team         ** See Completed Assessments for Annual Wellness Visit within the encounter summary.**         The following assessments were completed:  Living Situation  CAGE  Depression Screening  Timed Get Up and Go  Whisper Test  Cognitive Function Screening  Nutrition Screening  ADL Screening  PAQ Screening        Opioid Documentation    does not have a current opioid prescription.       Vitals:    05/17/24 1013   BP: 126/66   Pulse: 62   Resp: 16   Temp: 97.8 °F (36.6 °C)   SpO2: 97%   Weight: 88.7 kg (195 lb 9.6 oz)   Height: 5' 10" (1.778 m)     Body mass index is 28.07 kg/m².  Physical Exam  Vitals and nursing note reviewed.   Constitutional:       General: He is not in acute distress.     Appearance: Normal appearance. He is not ill-appearing.   Eyes:      Extraocular Movements: Extraocular movements intact.      Pupils: Pupils are equal, round, and reactive to light.   Cardiovascular:      Rate and Rhythm: Normal rate. Rhythm irregularly irregular.      Heart sounds: Normal heart sounds.   Pulmonary:      Effort: Pulmonary effort is normal.      Breath sounds: Normal breath sounds.   Abdominal:      General: Bowel sounds are normal.      Palpations: Abdomen is soft.   Musculoskeletal:         General: Normal range of motion.   Skin:     Findings: No rash.   Neurological:      General: No focal deficit present.      Mental Status: He is alert and oriented to person, place, and time. Mental status is at baseline.   Psychiatric:         Mood and Affect: Mood normal.         Behavior: Behavior normal.               Diagnoses and health risks identified today and associated recommendations/orders:    Problem List Items Addressed This " Visit          Psychiatric    Major depressive disorder, single episode, moderate (Chronic)       Pulmonary    Pulmonary fibrosis (Chronic)       Cardiac/Vascular    Orthostatic hypotension (Chronic)    Primary hypertension (Chronic)    Paroxysmal atrial fibrillation (Chronic)    Mixed hyperlipidemia (Chronic)    Aortic atherosclerosis (Chronic)    Dilated cardiomyopathy       Renal/    Benign prostatic hyperplasia (Chronic)       Other    ARDEN on CPAP (Chronic)     Other Visit Diagnoses       Encounter for subsequent annual wellness visit (AWV) in Medicare patient    -  Primary        Chronic health problems are stable, no changes made to his treatment plan today    Provided Vaibhav with a 5-10 year written screening schedule and personal prevention plan. Recommendations were developed using the USPSTF age appropriate recommendations. Education, counseling, and referrals were provided as needed. After Visit Summary printed and given to patient which includes a list of additional screenings\tests needed.    Follow up for 1 year for Annual Wellness Visit.     John Victor MD     I offered to discuss advanced care planning, including how to pick a person who would make decisions for you if you were unable to make them for yourself, called a health care power of , and what kind of decisions you might make such as use of life sustaining treatments such as ventilators and tube feeding when faced with a life limiting illness recorded on a living will that they will need to know. (How you want to be cared for as you near the end of your natural life)     X Patient is interested in learning more about how to make advanced directives.  I provided them paperwork and offered to discuss this with them.

## 2024-05-17 ENCOUNTER — OFFICE VISIT (OUTPATIENT)
Dept: FAMILY MEDICINE | Facility: CLINIC | Age: 84
End: 2024-05-17
Payer: MEDICARE

## 2024-05-17 VITALS
SYSTOLIC BLOOD PRESSURE: 126 MMHG | DIASTOLIC BLOOD PRESSURE: 66 MMHG | WEIGHT: 195.63 LBS | HEIGHT: 70 IN | RESPIRATION RATE: 16 BRPM | OXYGEN SATURATION: 97 % | TEMPERATURE: 98 F | HEART RATE: 62 BPM | BODY MASS INDEX: 28.01 KG/M2

## 2024-05-17 DIAGNOSIS — Z00.00 ENCOUNTER FOR SUBSEQUENT ANNUAL WELLNESS VISIT (AWV) IN MEDICARE PATIENT: Primary | ICD-10-CM

## 2024-05-17 DIAGNOSIS — G47.33 OSA ON CPAP: Chronic | ICD-10-CM

## 2024-05-17 DIAGNOSIS — R35.0 BENIGN PROSTATIC HYPERPLASIA WITH URINARY FREQUENCY: Chronic | ICD-10-CM

## 2024-05-17 DIAGNOSIS — I42.0 DILATED CARDIOMYOPATHY: Chronic | ICD-10-CM

## 2024-05-17 DIAGNOSIS — N40.1 BENIGN PROSTATIC HYPERPLASIA WITH URINARY FREQUENCY: Chronic | ICD-10-CM

## 2024-05-17 DIAGNOSIS — I10 PRIMARY HYPERTENSION: Chronic | ICD-10-CM

## 2024-05-17 DIAGNOSIS — E78.2 MIXED HYPERLIPIDEMIA: Chronic | ICD-10-CM

## 2024-05-17 DIAGNOSIS — J84.10 PULMONARY FIBROSIS: Chronic | ICD-10-CM

## 2024-05-17 DIAGNOSIS — I48.0 PAROXYSMAL ATRIAL FIBRILLATION: Chronic | ICD-10-CM

## 2024-05-17 DIAGNOSIS — I70.0 AORTIC ATHEROSCLEROSIS: Chronic | ICD-10-CM

## 2024-05-17 DIAGNOSIS — F32.1 MAJOR DEPRESSIVE DISORDER, SINGLE EPISODE, MODERATE: Chronic | ICD-10-CM

## 2024-05-17 DIAGNOSIS — I95.1 ORTHOSTATIC HYPOTENSION: Chronic | ICD-10-CM

## 2024-05-17 PROCEDURE — G0439 PPPS, SUBSEQ VISIT: HCPCS | Mod: ,,, | Performed by: FAMILY MEDICINE

## 2024-05-17 NOTE — PATIENT INSTRUCTIONS
Counseling and Referral of Other Preventative  (Italic type indicates deductible and co-insurance are waived)    Patient Name: Vaibhav Tong  Today's Date: 5/17/2024    Health Maintenance       Date Due Completion Date    TETANUS VACCINE Never done ---    RSV Vaccine (Age 60+ and Pregnant patients) (1 - 1-dose 60+ series) Never done ---    Shingles Vaccine (1 of 2) 03/30/2012 2/3/2012    COVID-19 Vaccine (3 - Moderna risk series) 11/30/2021 11/2/2021    Eye Exam 01/05/2023 1/5/2022    PROSTATE-SPECIFIC ANTIGEN 11/02/2024 11/2/2023    Lipid Panel 11/02/2024 11/2/2023    Aspirin/Antiplatelet Therapy 04/08/2025 4/8/2024        No orders of the defined types were placed in this encounter.      The following information is provided to all patients.  This information is to help you find resources for any of the problems found today that may be affecting your health:                  Living healthy guide: [place]    Understanding Diabetes: www.diabetes.org      Eating healthy: www.cdc.gov/healthyweight      CDC home safety checklist: www.cdc.gov/steadi/patient.html      Agency on Aging: [place].gov    Alcoholics anonymous (AA): www.aa.org      Physical Activity: www.kristy.nih.gov/pl6jabr      Tobacco use: [place]

## 2024-06-06 ENCOUNTER — TELEPHONE (OUTPATIENT)
Dept: FAMILY MEDICINE | Facility: CLINIC | Age: 84
End: 2024-06-06
Payer: MEDICARE

## 2024-06-06 DIAGNOSIS — R35.0 BENIGN PROSTATIC HYPERPLASIA WITH URINARY FREQUENCY: Chronic | ICD-10-CM

## 2024-06-06 DIAGNOSIS — N40.1 BENIGN PROSTATIC HYPERPLASIA WITH URINARY FREQUENCY: Chronic | ICD-10-CM

## 2024-06-06 NOTE — TELEPHONE ENCOUNTER
----- Message from Jina Enriquez sent at 6/6/2024  9:11 AM CDT -----  Pt is claiming that [facility] does not have an order for his tamsulosin (FLOMAX) 0.4 mg Cap. Please resend to pharmacy

## 2024-06-06 NOTE — TELEPHONE ENCOUNTER
Contacted pharmacy to check and see if they did have the prescription. Pharmacist verified they did have it on hold and would get it ready for the pt. Attempted to contact pt to notify him of this. No answer, left voicemail letting him know his medicine should be ready at the pharmacy this afternoon and to call us with any questions or concerns.

## 2024-06-09 DIAGNOSIS — Z71.89 COMPLEX CARE COORDINATION: ICD-10-CM

## 2024-07-09 ENCOUNTER — HOSPITAL ENCOUNTER (OUTPATIENT)
Dept: RADIOLOGY | Facility: HOSPITAL | Age: 84
Discharge: HOME | End: 2024-07-09
Attending: NURSE PRACTITIONER
Payer: MEDICARE

## 2024-07-09 ENCOUNTER — OFFICE VISIT (OUTPATIENT)
Dept: VASCULAR SURGERY | Facility: CLINIC | Age: 84
End: 2024-07-09
Payer: MEDICARE

## 2024-07-09 VITALS — WEIGHT: 190 LBS | HEIGHT: 70 IN | BODY MASS INDEX: 27.2 KG/M2

## 2024-07-09 DIAGNOSIS — I65.21 CAROTID STENOSIS, RIGHT: ICD-10-CM

## 2024-07-09 DIAGNOSIS — I65.21 CAROTID STENOSIS, RIGHT: Primary | ICD-10-CM

## 2024-07-09 PROCEDURE — 99999 PR PBB SHADOW E&M-EST. PATIENT-LVL III: CPT | Mod: PBBFAC,,, | Performed by: NURSE PRACTITIONER

## 2024-07-09 PROCEDURE — 99214 OFFICE O/P EST MOD 30 MIN: CPT | Mod: S$PBB,,, | Performed by: NURSE PRACTITIONER

## 2024-07-09 PROCEDURE — 93880 EXTRACRANIAL BILAT STUDY: CPT | Mod: TC

## 2024-07-09 PROCEDURE — 99213 OFFICE O/P EST LOW 20 MIN: CPT | Mod: PBBFAC,25 | Performed by: NURSE PRACTITIONER

## 2024-07-09 PROCEDURE — 93880 EXTRACRANIAL BILAT STUDY: CPT | Mod: 26,,, | Performed by: SURGERY

## 2024-07-09 NOTE — PROGRESS NOTES
"Subjective:       Patient ID: Vaibhav Tong is a 84 y.o. male.    Chief Complaint: Follow-up (US today)  07/09/2024  patient followed for carotid stenosis 1 year follow-up carotid duplex stable right ICA CCA ratio 2.28 left ICA CCA ratio 1.14 asymptomatic nonsmoker  History of MI hypertension hyperlipidemia  family history includes Heart disease in his brother and sister; Hypertension in his mother and sister; No Known Problems in his father.  Past Medical History:   Diagnosis Date    Atrial fibrillation     BPH (benign prostatic hyperplasia)     Chronic lymphocytic leukemia     Fatigue     GERD (gastroesophageal reflux disease)     Heart attack     Hyperlipidemia     Hypertension     Myocardial infarction     Pulmonary fibrosis     Shortness of breath     Sleep apnea, unspecified     Unspecified chronic bronchitis     Wheezing       Past Surgical History:   Procedure Laterality Date    abaltion      APPENDECTOMY         reports that he quit smoking about 54 years ago. His smoking use included cigarettes. He started smoking about 66 years ago. He has a 12 pack-year smoking history. He has been exposed to tobacco smoke. He has never used smokeless tobacco. He reports that he does not drink alcohol and does not use drugs.   Follow-up      Review of Systems   Cardiovascular:  Negative for leg swelling and claudication.   Integumentary:  Negative for wound.   All other systems reviewed and are negative.        Objective:      Ht 5' 10" (1.778 m)   Wt 86.2 kg (190 lb)   BMI 27.26 kg/m²    Physical Exam  Vitals and nursing note reviewed.   Constitutional:       Appearance: Normal appearance.   HENT:      Head: Normocephalic.      Mouth/Throat:      Mouth: Mucous membranes are moist.   Eyes:      Conjunctiva/sclera: Conjunctivae normal.   Cardiovascular:      Rate and Rhythm: Normal rate and regular rhythm.   Pulmonary:      Effort: Pulmonary effort is normal.   Abdominal:      Palpations: Abdomen is soft.   Skin:     " General: Skin is warm and dry.   Neurological:      Mental Status: He is alert and oriented to person, place, and time.   Psychiatric:         Mood and Affect: Mood normal.           Assessment:       1. Carotid stenosis, right        Plan:         Educated patient is on carotid stenosis  Continue antiplatelets statin  Educated on signs symptoms of CVA  Repeat carotid duplex in 1 year

## 2024-08-23 DIAGNOSIS — E78.2 MIXED HYPERLIPIDEMIA: Chronic | ICD-10-CM

## 2024-08-23 RX ORDER — ATORVASTATIN CALCIUM 40 MG/1
40 TABLET, FILM COATED ORAL NIGHTLY
Qty: 90 TABLET | Refills: 1 | Status: SHIPPED | OUTPATIENT
Start: 2024-08-23

## 2024-09-03 DIAGNOSIS — N40.1 BENIGN PROSTATIC HYPERPLASIA WITH URINARY FREQUENCY: Chronic | ICD-10-CM

## 2024-09-03 DIAGNOSIS — R35.0 BENIGN PROSTATIC HYPERPLASIA WITH URINARY FREQUENCY: Chronic | ICD-10-CM

## 2024-09-03 RX ORDER — FINASTERIDE 5 MG/1
5 TABLET, FILM COATED ORAL NIGHTLY
Qty: 90 TABLET | Refills: 0 | Status: SHIPPED | OUTPATIENT
Start: 2024-09-03

## 2024-09-03 NOTE — TELEPHONE ENCOUNTER
----- Message from Eileen Plunkett sent at 9/3/2024  9:23 AM CDT -----  Patient called in requesting a refill of finasteride (PROSCAR) 5 mg tablet sent to [facility] call back is [phone]. Thanks!

## 2024-12-03 ENCOUNTER — OFFICE VISIT (OUTPATIENT)
Dept: FAMILY MEDICINE | Facility: CLINIC | Age: 84
End: 2024-12-03
Payer: MEDICARE

## 2024-12-03 VITALS
HEART RATE: 72 BPM | OXYGEN SATURATION: 96 % | DIASTOLIC BLOOD PRESSURE: 60 MMHG | RESPIRATION RATE: 16 BRPM | WEIGHT: 199.38 LBS | SYSTOLIC BLOOD PRESSURE: 127 MMHG | TEMPERATURE: 98 F | HEIGHT: 70 IN | BODY MASS INDEX: 28.54 KG/M2

## 2024-12-03 DIAGNOSIS — N40.1 BENIGN PROSTATIC HYPERPLASIA WITH URINARY FREQUENCY: Chronic | ICD-10-CM

## 2024-12-03 DIAGNOSIS — I10 PRIMARY HYPERTENSION: Primary | ICD-10-CM

## 2024-12-03 DIAGNOSIS — R35.0 BENIGN PROSTATIC HYPERPLASIA WITH URINARY FREQUENCY: Chronic | ICD-10-CM

## 2024-12-03 DIAGNOSIS — E78.2 MIXED HYPERLIPIDEMIA: Chronic | ICD-10-CM

## 2024-12-03 DIAGNOSIS — Z12.5 SCREENING FOR PROSTATE CANCER: ICD-10-CM

## 2024-12-03 LAB
ANION GAP SERPL CALCULATED.3IONS-SCNC: 11 MMOL/L (ref 7–16)
BASOPHILS # BLD AUTO: 0.42 K/UL (ref 0–0.2)
BASOPHILS NFR BLD AUTO: 0.6 % (ref 0–1)
BILIRUB UR QL STRIP: NEGATIVE
BUN SERPL-MCNC: 26 MG/DL (ref 8–26)
BUN/CREAT SERPL: 22 (ref 6–20)
CALCIUM SERPL-MCNC: 9 MG/DL (ref 8.8–10)
CHLORIDE SERPL-SCNC: 103 MMOL/L (ref 98–107)
CHOLEST SERPL-MCNC: 119 MG/DL
CHOLEST/HDLC SERPL: 4.6 {RATIO}
CLARITY UR: CLEAR
CO2 SERPL-SCNC: 24 MMOL/L (ref 23–31)
COLOR UR: COLORLESS
CREAT SERPL-MCNC: 1.17 MG/DL (ref 0.72–1.25)
CREAT UR-MCNC: 61 MG/DL (ref 23–375)
DIFFERENTIAL METHOD BLD: ABNORMAL
EGFR (NO RACE VARIABLE) (RUSH/TITUS): 61 ML/MIN/1.73M2
EOSINOPHIL # BLD AUTO: 0.36 K/UL (ref 0–0.5)
EOSINOPHIL NFR BLD AUTO: 0.5 % (ref 1–4)
ERYTHROCYTE [DISTWIDTH] IN BLOOD BY AUTOMATED COUNT: 14.6 % (ref 11.5–14.5)
GLUCOSE SERPL-MCNC: 98 MG/DL (ref 82–115)
GLUCOSE UR STRIP-MCNC: NORMAL MG/DL
HCT VFR BLD AUTO: 40.5 % (ref 40–54)
HDLC SERPL-MCNC: 26 MG/DL (ref 35–60)
HGB BLD-MCNC: 12.5 G/DL (ref 13.5–18)
IMM GRANULOCYTES # BLD AUTO: 0.21 K/UL (ref 0–0.04)
IMM GRANULOCYTES NFR BLD: 0.3 % (ref 0–0.4)
KETONES UR STRIP-SCNC: NEGATIVE MG/DL
LDLC SERPL CALC-MCNC: 35 MG/DL
LDLC/HDLC SERPL: 1.3 {RATIO}
LEUKOCYTE ESTERASE UR QL STRIP: NEGATIVE
LYMPHOCYTES # BLD AUTO: 60.58 K/UL (ref 1–4.8)
LYMPHOCYTES NFR BLD AUTO: 82.6 % (ref 27–41)
LYMPHOCYTES NFR BLD MANUAL: 87 % (ref 27–41)
MCH RBC QN AUTO: 28.2 PG (ref 27–31)
MCHC RBC AUTO-ENTMCNC: 30.9 G/DL (ref 32–36)
MCV RBC AUTO: 91.2 FL (ref 80–96)
MICROALBUMIN UR-MCNC: <0.5 MG/DL
MICROALBUMIN/CREAT RATIO PNL UR: NORMAL
MONOCYTES # BLD AUTO: 4.52 K/UL (ref 0–0.8)
MONOCYTES NFR BLD AUTO: 6.2 % (ref 2–6)
MONOCYTES NFR BLD MANUAL: 1 % (ref 2–6)
MPC BLD CALC-MCNC: 9.9 FL (ref 9.4–12.4)
MUCOUS, UA: ABNORMAL /LPF
NEUTROPHILS # BLD AUTO: 7.22 K/UL (ref 1.8–7.7)
NEUTROPHILS NFR BLD AUTO: 9.8 % (ref 53–65)
NEUTS BAND NFR BLD MANUAL: 3 % (ref 1–5)
NEUTS SEG NFR BLD MANUAL: 9 % (ref 50–62)
NITRITE UR QL STRIP: NEGATIVE
NONHDLC SERPL-MCNC: 93 MG/DL
NRBC # BLD AUTO: 0 X10E3/UL
NRBC, AUTO (.00): 0 %
PH UR STRIP: 7 PH UNITS
PLATELET # BLD AUTO: 251 K/UL (ref 150–400)
PLATELET MORPHOLOGY: NORMAL
POTASSIUM SERPL-SCNC: 4.2 MMOL/L (ref 3.5–5.1)
PROT UR QL STRIP: NEGATIVE
PSA SERPL-MCNC: 3.16 NG/ML
RBC # BLD AUTO: 4.44 M/UL (ref 4.6–6.2)
RBC # UR STRIP: ABNORMAL /UL
RBC #/AREA URNS HPF: 3 /HPF
RBC MORPH BLD: NORMAL
SMUDGE CELLS BLD QL SMEAR: ABNORMAL
SODIUM SERPL-SCNC: 134 MMOL/L (ref 136–145)
SP GR UR STRIP: 1.01
SQUAMOUS #/AREA URNS LPF: ABNORMAL /HPF
TRIGL SERPL-MCNC: 289 MG/DL (ref 34–140)
UROBILINOGEN UR STRIP-ACNC: NORMAL MG/DL
VLDLC SERPL-MCNC: 58 MG/DL
WBC # BLD AUTO: 73.31 K/UL (ref 4.5–11)
WBC #/AREA URNS HPF: <1 /HPF

## 2024-12-03 PROCEDURE — 85025 COMPLETE CBC W/AUTO DIFF WBC: CPT | Mod: ,,, | Performed by: CLINICAL MEDICAL LABORATORY

## 2024-12-03 PROCEDURE — 82043 UR ALBUMIN QUANTITATIVE: CPT | Mod: ,,, | Performed by: CLINICAL MEDICAL LABORATORY

## 2024-12-03 PROCEDURE — 81001 URINALYSIS AUTO W/SCOPE: CPT | Mod: ,,, | Performed by: CLINICAL MEDICAL LABORATORY

## 2024-12-03 PROCEDURE — 82570 ASSAY OF URINE CREATININE: CPT | Mod: ,,, | Performed by: CLINICAL MEDICAL LABORATORY

## 2024-12-03 PROCEDURE — 80061 LIPID PANEL: CPT | Mod: ,,, | Performed by: CLINICAL MEDICAL LABORATORY

## 2024-12-03 PROCEDURE — 80048 BASIC METABOLIC PNL TOTAL CA: CPT | Mod: ,,, | Performed by: CLINICAL MEDICAL LABORATORY

## 2024-12-03 PROCEDURE — 99214 OFFICE O/P EST MOD 30 MIN: CPT | Mod: ,,, | Performed by: FAMILY MEDICINE

## 2024-12-03 PROCEDURE — G0103 PSA SCREENING: HCPCS | Mod: ,,, | Performed by: CLINICAL MEDICAL LABORATORY

## 2024-12-03 RX ORDER — TAMSULOSIN HYDROCHLORIDE 0.4 MG/1
0.4 CAPSULE ORAL DAILY
Qty: 90 CAPSULE | Refills: 1 | Status: SHIPPED | OUTPATIENT
Start: 2024-12-03

## 2024-12-03 RX ORDER — ATORVASTATIN CALCIUM 40 MG/1
40 TABLET, FILM COATED ORAL NIGHTLY
Qty: 90 TABLET | Refills: 1 | Status: SHIPPED | OUTPATIENT
Start: 2024-12-03

## 2024-12-03 RX ORDER — FINASTERIDE 5 MG/1
5 TABLET, FILM COATED ORAL NIGHTLY
Qty: 90 TABLET | Refills: 1 | Status: SHIPPED | OUTPATIENT
Start: 2024-12-03

## 2024-12-03 NOTE — PROGRESS NOTES
John Victor MD    [address] [place] [address]     PATIENT NAME: Vaibhav Tong  : 1940  DATE: 12/3/24  MRN: [id number]      Billing Provider: John Victor MD  Level of Service: [place] OFFICE/OUTPT VISIT, EST, LEVL IV, 30-39 MIN  Patient PCP Information       Provider PCP Type    John Victor MD General            Reason for Visit / Chief Complaint: Hypertension, Hyperlipidemia, and Gastroesophageal Reflux (Medication refills)       Update PCP  Update Chief Complaint         History of Present Illness / Problem Focused Workflow     Vaibhav Tong presents to the clinic with Hypertension, Hyperlipidemia, and Gastroesophageal Reflux (Medication refills)     He is doing fairly well, no complaints     HPI    Review of Systems     Review of Systems   Constitutional:  Negative for activity change, appetite change, chills, fatigue and fever.   HENT:  Negative for nasal congestion, ear pain, hearing loss, postnasal drip and sore throat.    Respiratory:  Negative for cough, chest tightness, shortness of breath and wheezing.    Cardiovascular:  Negative for chest pain, palpitations, leg swelling and claudication.   Gastrointestinal:  Negative for abdominal pain, change in bowel habit, constipation, diarrhea, nausea and vomiting.   Genitourinary:  Negative for dysuria.   Musculoskeletal:  Negative for arthralgias, back pain, gait problem and myalgias.   Integumentary:  Negative for rash.   Neurological:  Negative for weakness and headaches.   Psychiatric/Behavioral:  Negative for suicidal ideas. The patient is not nervous/anxious.         Medical / Social / Family History     Past Medical History:   Diagnosis Date    Atrial fibrillation     BPH (benign prostatic hyperplasia)     Chronic lymphocytic leukemia     Fatigue     GERD (gastroesophageal reflux disease)     Heart attack     Hyperlipidemia     Hypertension     Myocardial infarction     Pulmonary  fibrosis     Shortness of breath     Sleep apnea, unspecified     Unspecified chronic bronchitis     Wheezing        Past Surgical History:   Procedure Laterality Date    abaltion      APPENDECTOMY         Social History    reports that he quit smoking about 54 years ago. His smoking use included cigarettes. He started smoking about 66 years ago. He has a 12 pack-year smoking history. He has been exposed to tobacco smoke. He has never used smokeless tobacco. He reports that he does not drink alcohol and does not use drugs.    Family History  's family history includes Heart disease in his brother and sister; Hypertension in his mother and sister; No Known Problems in his father.    Medications and Allergies     Medications  Outpatient Medications Marked as Taking for the 12/3/24 encounter (Office Visit) with John Victor MD   Medication Sig Dispense Refill    aspirin (ECOTRIN) 81 MG EC tablet Take 81 mg by mouth.      dofetilide (TIKOSYN) 250 MCG Cap Take 1 capsule by mouth 2 (two) times a day.      hydrALAZINE (APRESOLINE) 50 MG tablet       irbesartan (AVAPRO) 300 MG tablet Take 150 mg by mouth once daily.      metoprolol succinate (TOPROL-XL) 25 MG 24 hr tablet Take 25 mg by mouth 2 (two) times daily.      multivitamin (THERAGRAN) per tablet Take 1 tablet by mouth once daily.      pantoprazole (PROTONIX) 40 MG tablet Take 1 tablet (40 mg total) by mouth every morning. For REFLUX 90 tablet 1    [DISCONTINUED] atorvastatin (LIPITOR) 40 MG tablet Take 1 tablet (40 mg total) by mouth nightly. For CHOLESTEROL 90 tablet 1    [DISCONTINUED] finasteride (PROSCAR) 5 mg tablet Take 1 tablet (5 mg total) by mouth nightly. 90 tablet 0    [DISCONTINUED] tamsulosin (FLOMAX) 0.4 mg Cap Take 1 capsule (0.4 mg total) by mouth once daily. For URINE FLOW 90 capsule 1       Allergies  Review of patient's allergies indicates:   Allergen Reactions    Ace inhibitors Other (See Comments)     cough    Albuterol Other (See  "Comments)     "Extremely light headed and dizzy"    Codeine Other (See Comments)     "Makes me feel crazy and keeps me awake."       Physical Examination     Vitals:    12/03/24 1451   BP: 127/60   Pulse: 72   Resp: 16   Temp: 98.1 °F (36.7 °C)     Physical Exam  Vitals and nursing note reviewed.   Constitutional:       General: He is not in acute distress.     Appearance: Normal appearance. He is not ill-appearing.   Eyes:      Extraocular Movements: Extraocular movements intact.      Pupils: Pupils are equal, round, and reactive to light.   Cardiovascular:      Rate and Rhythm: Normal rate and regular rhythm.   Pulmonary:      Effort: Pulmonary effort is normal.      Breath sounds: Normal breath sounds.   Skin:     General: Skin is warm.      Findings: No rash.   Neurological:      General: No focal deficit present.      Mental Status: He is alert and oriented to person, place, and time. Mental status is at baseline.   Psychiatric:         Mood and Affect: Mood normal.         Behavior: Behavior normal.            Assessment and Plan (including Health Maintenance)      Problem List  Smart Sets  Document Outside HM   :    Plan:     He is doing well, no changes made today    Health Maintenance Due   Topic Date Due    TETANUS VACCINE  Never done    Shingles Vaccine (1 of 2) 03/30/2012    RSV Vaccine (Age 60+ and Pregnant patients) (1 - 1-dose 75+ series) Never done    Eye Exam  01/05/2023    PROSTATE-SPECIFIC ANTIGEN  11/02/2024    Lipid Panel  11/02/2024    COVID-19 Vaccine (4 - 2024-25 season) 12/17/2024       Problem List Items Addressed This Visit          Cardiac/Vascular    Primary hypertension - Primary (Chronic)    Overview      - Goal blood pressure for most patients is less than 130/85. Both numbers are important. If you have questions about your specific goal blood pressure, please ask at your clinic visits.   - Check blood pressure outside of clinic, record the numbers, and bring the log to all your " office visits.   - If you have any chest pain, SOB, or other concerning symptoms, report these immediately, or go to the nearest ER.    - Recommend cardiovascular exercise, at least 3 times per week, for at least 15 minutes.   - Eat a heart healthy diet.            Relevant Orders    Basic Metabolic Panel    CBC Auto Differential    Microalbumin/Creatinine Ratio, Urine    Urinalysis, Reflex to Urine Culture    Mixed hyperlipidemia (Chronic)    Relevant Medications    atorvastatin (LIPITOR) 40 MG tablet    Other Relevant Orders    Lipid Panel       Renal/    Benign prostatic hyperplasia (Chronic)    Relevant Medications    finasteride (PROSCAR) 5 mg tablet    tamsulosin (FLOMAX) 0.4 mg Cap     Other Visit Diagnoses       Screening for prostate cancer        Relevant Orders    PSA, Screening            Health Maintenance Topics with due status: Not Due       Topic Last Completion Date    Aspirin/Antiplatelet Therapy 12/03/2024       Procedures     Future Appointments   Date Time Provider Department Center   5/23/2025 10:00 AM AWV NURSE, [facility]   7/8/2025  1:30 PM [facility] US1 [facility] VASCUS [facility]    7/8/2025  2:00 PM Christiana Bhakta, MALACHIP [facility]        Follow up in about 6 months (around 6/3/2025), or if symptoms worsen or fail to improve, for chronic health problems, hypertension.     Signature:  John Victor MD  [address] [place] [address]  Phone #: [phone]  Fax #: [phone]    Date of encounter: 12/3/24    There are no Patient Instructions on file for this visit.

## 2024-12-04 NOTE — TELEPHONE ENCOUNTER
Dr. Victor's notes states the patient has CLL.  The WBC is up.  If he sees Hematology these results need to be sent there.  Unless he is having problems I think it will be ok for Dr. Victor to review tomorrow.

## 2024-12-04 NOTE — TELEPHONE ENCOUNTER
Patient of Dr. Victor.  Seen on 12/3 for routine check up with labs.  Critical value called for WBC of 73.3.  It does appear patient has history of elevated WBC.  Dr. Victor is out of office today.  Please advise.

## 2024-12-05 RX ORDER — CLOPIDOGREL BISULFATE 75 MG/1
75 TABLET ORAL DAILY
Qty: 90 TABLET | Refills: 1 | Status: SHIPPED | OUTPATIENT
Start: 2024-12-05

## 2024-12-05 RX ORDER — CLOPIDOGREL BISULFATE 75 MG/1
75 TABLET ORAL DAILY
COMMUNITY
End: 2024-12-05 | Stop reason: SDUPTHER

## 2024-12-05 NOTE — TELEPHONE ENCOUNTER
----- Message from John Victor MD sent at 12/5/2024  8:18 AM CST -----  PSA is higher than it was last year, repeat at his 6 month follow up     WBC is quite elevated, he saw Heme/Onc 2 weeks and the number was higher     Triglycerides are elevated on these labs, omega-3 fatty acids recommended

## 2025-04-01 PROCEDURE — 88312 SPECIAL STAINS GROUP 1: CPT | Mod: 26,,, | Performed by: PATHOLOGY

## 2025-04-01 PROCEDURE — 88305 TISSUE EXAM BY PATHOLOGIST: CPT | Mod: 26,,, | Performed by: PATHOLOGY

## 2025-04-01 PROCEDURE — 88305 TISSUE EXAM BY PATHOLOGIST: CPT | Mod: TC,SUR | Performed by: DERMATOLOGY

## 2025-04-02 ENCOUNTER — LAB REQUISITION (OUTPATIENT)
Dept: LAB | Facility: HOSPITAL | Age: 85
End: 2025-04-02
Attending: DERMATOLOGY
Payer: MEDICARE

## 2025-04-02 DIAGNOSIS — D49.2 NEOPLASM OF UNSPECIFIED BEHAVIOR OF BONE, SOFT TISSUE, AND SKIN: ICD-10-CM

## 2025-04-22 ENCOUNTER — OFFICE VISIT (OUTPATIENT)
Dept: FAMILY MEDICINE | Facility: CLINIC | Age: 85
End: 2025-04-22
Payer: MEDICARE

## 2025-04-22 VITALS
BODY MASS INDEX: 27.92 KG/M2 | WEIGHT: 195 LBS | TEMPERATURE: 99 F | DIASTOLIC BLOOD PRESSURE: 62 MMHG | RESPIRATION RATE: 20 BRPM | OXYGEN SATURATION: 95 % | SYSTOLIC BLOOD PRESSURE: 120 MMHG | HEIGHT: 70 IN | HEART RATE: 58 BPM

## 2025-04-22 DIAGNOSIS — J01.40 ACUTE PANSINUSITIS, RECURRENCE NOT SPECIFIED: Primary | ICD-10-CM

## 2025-04-22 DIAGNOSIS — J34.9 SINUS PROBLEM: ICD-10-CM

## 2025-04-22 LAB
CTP QC/QA: YES
CTP QC/QA: YES
POC MOLECULAR INFLUENZA A AGN: NEGATIVE
POC MOLECULAR INFLUENZA B AGN: NEGATIVE
SARS-COV-2 RDRP RESP QL NAA+PROBE: NEGATIVE

## 2025-04-22 PROCEDURE — 87502 INFLUENZA DNA AMP PROBE: CPT | Mod: RHCUB

## 2025-04-22 PROCEDURE — 96372 THER/PROPH/DIAG INJ SC/IM: CPT | Mod: ,,,

## 2025-04-22 PROCEDURE — 87635 SARS-COV-2 COVID-19 AMP PRB: CPT | Mod: RHCUB

## 2025-04-22 PROCEDURE — 99213 OFFICE O/P EST LOW 20 MIN: CPT | Mod: ,,,

## 2025-04-22 RX ORDER — DEXAMETHASONE SODIUM PHOSPHATE 4 MG/ML
4 INJECTION, SOLUTION INTRA-ARTICULAR; INTRALESIONAL; INTRAMUSCULAR; INTRAVENOUS; SOFT TISSUE
Status: COMPLETED | OUTPATIENT
Start: 2025-04-22 | End: 2025-04-22

## 2025-04-22 RX ORDER — CEFTRIAXONE 1 G/1
1 INJECTION, POWDER, FOR SOLUTION INTRAMUSCULAR; INTRAVENOUS
Status: COMPLETED | OUTPATIENT
Start: 2025-04-22 | End: 2025-04-22

## 2025-04-22 RX ORDER — AMOXICILLIN AND CLAVULANATE POTASSIUM 875; 125 MG/1; MG/1
1 TABLET, FILM COATED ORAL EVERY 12 HOURS
Qty: 14 TABLET | Refills: 0 | Status: SHIPPED | OUTPATIENT
Start: 2025-04-22 | End: 2025-04-29

## 2025-04-22 RX ADMIN — CEFTRIAXONE 1 G: 1 INJECTION, POWDER, FOR SOLUTION INTRAMUSCULAR; INTRAVENOUS at 01:04

## 2025-04-22 RX ADMIN — DEXAMETHASONE SODIUM PHOSPHATE 4 MG: 4 INJECTION, SOLUTION INTRA-ARTICULAR; INTRALESIONAL; INTRAMUSCULAR; INTRAVENOUS; SOFT TISSUE at 01:04

## 2025-04-22 NOTE — PROGRESS NOTES
Subjective     Patient ID: Vaibhav Tong is a 85 y.o. male.    Chief Complaint: Sinus Problem (Since Sunday having runny nose. No sore throat. )    Sinus Problem      Review of Systems       Objective     Physical Exam  Vitals and nursing note reviewed.   Constitutional:       Appearance: Normal appearance.   HENT:      Head: Normocephalic and atraumatic.      Right Ear: Tympanic membrane, ear canal and external ear normal.      Left Ear: Tympanic membrane, ear canal and external ear normal.      Nose: Congestion present.      Right Sinus: Maxillary sinus tenderness and frontal sinus tenderness present.      Left Sinus: Maxillary sinus tenderness and frontal sinus tenderness present.      Mouth/Throat:      Mouth: Mucous membranes are moist.      Pharynx: Oropharynx is clear. Posterior oropharyngeal erythema present.   Cardiovascular:      Rate and Rhythm: Normal rate and regular rhythm.      Pulses: Normal pulses.      Heart sounds: Normal heart sounds.   Pulmonary:      Effort: Pulmonary effort is normal.      Breath sounds: Normal breath sounds.   Musculoskeletal:         General: Normal range of motion.      Cervical back: Normal range of motion.   Skin:     General: Skin is warm.      Capillary Refill: Capillary refill takes less than 2 seconds.   Neurological:      General: No focal deficit present.      Mental Status: He is alert.   Psychiatric:         Mood and Affect: Mood normal.            Assessment and Plan     1. Acute pansinusitis, recurrence not specified  -     dexAMETHasone injection 4 mg  -     cefTRIAXone injection 1 g  -     amoxicillin-clavulanate 875-125mg (AUGMENTIN) 875-125 mg per tablet; Take 1 tablet by mouth every 12 (twelve) hours. for 7 days  Dispense: 14 tablet; Refill: 0    2. Sinus problem  -     POCT Influenza A/B Molecular  -     POCT COVID-19 Rapid Screening        No distress noted         Follow up if symptoms worsen or fail to improve.

## 2025-05-27 DIAGNOSIS — N40.1 BENIGN PROSTATIC HYPERPLASIA WITH URINARY FREQUENCY: Chronic | ICD-10-CM

## 2025-05-27 DIAGNOSIS — R35.0 BENIGN PROSTATIC HYPERPLASIA WITH URINARY FREQUENCY: Chronic | ICD-10-CM

## 2025-05-28 RX ORDER — FINASTERIDE 5 MG/1
5 TABLET, FILM COATED ORAL NIGHTLY
Qty: 90 TABLET | Refills: 0 | Status: SHIPPED | OUTPATIENT
Start: 2025-05-28

## 2025-05-28 RX ORDER — TAMSULOSIN HYDROCHLORIDE 0.4 MG/1
0.4 CAPSULE ORAL DAILY
Qty: 90 CAPSULE | Refills: 0 | Status: SHIPPED | OUTPATIENT
Start: 2025-05-28

## 2025-06-03 ENCOUNTER — OFFICE VISIT (OUTPATIENT)
Dept: FAMILY MEDICINE | Facility: CLINIC | Age: 85
End: 2025-06-03
Payer: MEDICARE

## 2025-06-03 VITALS
HEART RATE: 61 BPM | OXYGEN SATURATION: 97 % | SYSTOLIC BLOOD PRESSURE: 107 MMHG | BODY MASS INDEX: 27.41 KG/M2 | TEMPERATURE: 98 F | DIASTOLIC BLOOD PRESSURE: 59 MMHG | RESPIRATION RATE: 18 BRPM | HEIGHT: 70 IN | WEIGHT: 191.5 LBS

## 2025-06-03 DIAGNOSIS — E78.2 MIXED HYPERLIPIDEMIA: Chronic | ICD-10-CM

## 2025-06-03 DIAGNOSIS — J84.10 PULMONARY FIBROSIS: Chronic | ICD-10-CM

## 2025-06-03 DIAGNOSIS — I48.0 PAROXYSMAL ATRIAL FIBRILLATION: Primary | Chronic | ICD-10-CM

## 2025-06-03 DIAGNOSIS — Z12.5 SCREENING PSA (PROSTATE SPECIFIC ANTIGEN): ICD-10-CM

## 2025-06-03 DIAGNOSIS — G47.33 OSA ON CPAP: Chronic | ICD-10-CM

## 2025-06-03 DIAGNOSIS — I42.0 DILATED CARDIOMYOPATHY: Chronic | ICD-10-CM

## 2025-06-03 DIAGNOSIS — N40.1 BENIGN PROSTATIC HYPERPLASIA WITH URINARY FREQUENCY: Chronic | ICD-10-CM

## 2025-06-03 DIAGNOSIS — I10 PRIMARY HYPERTENSION: Chronic | ICD-10-CM

## 2025-06-03 DIAGNOSIS — D83.9 COMMON VARIABLE IMMUNODEFICIENCY, UNSPECIFIED: Chronic | ICD-10-CM

## 2025-06-03 DIAGNOSIS — C91.10 CHRONIC LYMPHOCYTIC LEUKEMIA: Chronic | ICD-10-CM

## 2025-06-03 DIAGNOSIS — K21.9 GASTROESOPHAGEAL REFLUX DISEASE WITHOUT ESOPHAGITIS: Chronic | ICD-10-CM

## 2025-06-03 DIAGNOSIS — F32.1 MAJOR DEPRESSIVE DISORDER, SINGLE EPISODE, MODERATE: Chronic | ICD-10-CM

## 2025-06-03 DIAGNOSIS — R35.0 BENIGN PROSTATIC HYPERPLASIA WITH URINARY FREQUENCY: Chronic | ICD-10-CM

## 2025-06-03 LAB
ALBUMIN SERPL BCP-MCNC: 4.2 G/DL (ref 3.4–4.8)
ALBUMIN/GLOB SERPL: 1.6 {RATIO}
ALP SERPL-CCNC: 65 U/L (ref 40–150)
ALT SERPL W P-5'-P-CCNC: 24 U/L
ANION GAP SERPL CALCULATED.3IONS-SCNC: 10 MMOL/L (ref 7–16)
AST SERPL W P-5'-P-CCNC: 25 U/L (ref 11–45)
BILIRUB SERPL-MCNC: 0.6 MG/DL
BUN SERPL-MCNC: 17 MG/DL (ref 8–26)
BUN/CREAT SERPL: 15 (ref 6–20)
CALCIUM SERPL-MCNC: 9.1 MG/DL (ref 8.8–10)
CHLORIDE SERPL-SCNC: 102 MMOL/L (ref 98–107)
CO2 SERPL-SCNC: 26 MMOL/L (ref 23–31)
CREAT SERPL-MCNC: 1.17 MG/DL (ref 0.72–1.25)
EGFR (NO RACE VARIABLE) (RUSH/TITUS): 61 ML/MIN/1.73M2
GLOBULIN SER-MCNC: 2.7 G/DL (ref 2–4)
GLUCOSE SERPL-MCNC: 104 MG/DL (ref 82–115)
POTASSIUM SERPL-SCNC: 4.5 MMOL/L (ref 3.5–5.1)
PROT SERPL-MCNC: 6.9 G/DL (ref 5.8–7.6)
PSA SERPL-MCNC: 2.18 NG/ML
SODIUM SERPL-SCNC: 133 MMOL/L (ref 136–145)

## 2025-06-03 PROCEDURE — G0103 PSA SCREENING: HCPCS | Mod: ,,, | Performed by: CLINICAL MEDICAL LABORATORY

## 2025-06-03 PROCEDURE — 80053 COMPREHEN METABOLIC PANEL: CPT | Mod: ,,, | Performed by: CLINICAL MEDICAL LABORATORY

## 2025-06-03 PROCEDURE — 99214 OFFICE O/P EST MOD 30 MIN: CPT | Mod: ,,, | Performed by: FAMILY MEDICINE

## 2025-06-03 RX ORDER — PANTOPRAZOLE SODIUM 40 MG/1
40 TABLET, DELAYED RELEASE ORAL EVERY MORNING
Qty: 90 TABLET | Refills: 1 | Status: SHIPPED | OUTPATIENT
Start: 2025-06-03

## 2025-06-03 RX ORDER — FINASTERIDE 5 MG/1
5 TABLET, FILM COATED ORAL NIGHTLY
Qty: 90 TABLET | Refills: 1 | Status: SHIPPED | OUTPATIENT
Start: 2025-06-03

## 2025-06-03 RX ORDER — CLOPIDOGREL BISULFATE 75 MG/1
75 TABLET ORAL DAILY
Qty: 90 TABLET | Refills: 1 | Status: SHIPPED | OUTPATIENT
Start: 2025-06-03

## 2025-06-03 RX ORDER — TAMSULOSIN HYDROCHLORIDE 0.4 MG/1
0.4 CAPSULE ORAL DAILY
Qty: 90 CAPSULE | Refills: 1 | Status: SHIPPED | OUTPATIENT
Start: 2025-06-03

## 2025-06-03 RX ORDER — ATORVASTATIN CALCIUM 40 MG/1
40 TABLET, FILM COATED ORAL NIGHTLY
Qty: 90 TABLET | Refills: 1 | Status: SHIPPED | OUTPATIENT
Start: 2025-06-03

## 2025-06-05 ENCOUNTER — RESULTS FOLLOW-UP (OUTPATIENT)
Dept: FAMILY MEDICINE | Facility: CLINIC | Age: 85
End: 2025-06-05

## 2025-07-08 ENCOUNTER — HOSPITAL ENCOUNTER (OUTPATIENT)
Dept: RADIOLOGY | Facility: HOSPITAL | Age: 85
Discharge: HOME | End: 2025-07-08
Attending: NURSE PRACTITIONER
Payer: MEDICARE

## 2025-07-08 ENCOUNTER — OFFICE VISIT (OUTPATIENT)
Dept: VASCULAR SURGERY | Facility: CLINIC | Age: 85
End: 2025-07-08
Payer: MEDICARE

## 2025-07-08 VITALS — BODY MASS INDEX: 27.15 KG/M2 | WEIGHT: 189.63 LBS | HEIGHT: 70 IN

## 2025-07-08 DIAGNOSIS — I65.21 CAROTID STENOSIS, RIGHT: ICD-10-CM

## 2025-07-08 DIAGNOSIS — I65.21 CAROTID STENOSIS, RIGHT: Primary | ICD-10-CM

## 2025-07-08 PROCEDURE — 93880 EXTRACRANIAL BILAT STUDY: CPT | Mod: TC

## 2025-07-08 PROCEDURE — 93880 EXTRACRANIAL BILAT STUDY: CPT | Mod: 26,,, | Performed by: SURGERY

## 2025-07-08 PROCEDURE — 99213 OFFICE O/P EST LOW 20 MIN: CPT | Mod: PBBFAC,25 | Performed by: NURSE PRACTITIONER

## 2025-07-08 PROCEDURE — 99214 OFFICE O/P EST MOD 30 MIN: CPT | Mod: S$PBB,,, | Performed by: NURSE PRACTITIONER

## 2025-07-08 PROCEDURE — 99999 PR PBB SHADOW E&M-EST. PATIENT-LVL III: CPT | Mod: PBBFAC,,, | Performed by: NURSE PRACTITIONER

## 2025-07-08 NOTE — PROGRESS NOTES
"Subjective:       Patient ID: Vaibhav Tong is a 85 y.o. male.    Chief Complaint: Follow-up (US today)  With asymptomatic carotid artery disease  He is here today 1 year follow-up carotid duplex with bilateral antegrade flow  Right ICA CCA ratio 2.13 left ICA CCA ratio 1.45  Right ICA less than 50% by velocity less than 70% stenosis by ratio  Left carotid artery stenosis less than 50%  He has had no neurological event  Past medical history includes diabetes hypertension nonsmoker  Patient recently got  he is here today with his new Bride  family history includes Heart disease in his brother and sister; Hypertension in his mother and sister; No Known Problems in his father.  Past Medical History:   Diagnosis Date    Atrial fibrillation     BPH (benign prostatic hyperplasia)     Chronic lymphocytic leukemia     Fatigue     GERD (gastroesophageal reflux disease)     Heart attack     Hyperlipidemia     Hypertension     Myocardial infarction     Pulmonary fibrosis     Shortness of breath     Sleep apnea, unspecified     Unspecified chronic bronchitis     Wheezing       Past Surgical History:   Procedure Laterality Date    abaltion      APPENDECTOMY         reports that he quit smoking about 55 years ago. His smoking use included cigarettes. He started smoking about 67 years ago. He has a 12 pack-year smoking history. He has been exposed to tobacco smoke. He has never used smokeless tobacco. He reports that he does not drink alcohol and does not use drugs.   Follow-up      Review of Systems   Cardiovascular:  Negative for leg swelling and claudication.   Integumentary:  Negative for wound.   All other systems reviewed and are negative.        Objective:      Ht 5' 10" (1.778 m)   Wt 86 kg (189 lb 9.5 oz)   BMI 27.20 kg/m²    Physical Exam  Vitals and nursing note reviewed.   Constitutional:       Appearance: Normal appearance.   HENT:      Head: Normocephalic.      Mouth/Throat:      Mouth: Mucous membranes are " moist.   Eyes:      Conjunctiva/sclera: Conjunctivae normal.   Cardiovascular:      Rate and Rhythm: Normal rate and regular rhythm.   Pulmonary:      Effort: Pulmonary effort is normal.   Abdominal:      Palpations: Abdomen is soft.   Skin:     General: Skin is warm and dry.   Neurological:      Mental Status: He is alert and oriented to person, place, and time.   Psychiatric:         Mood and Affect: Mood normal.           Assessment:       1. Carotid stenosis, right        Plan:       Continue current medication    Educated patient on carotid duplex no surgery recommended  Follow-up in 1 year with carotid duplex

## 2025-08-29 DIAGNOSIS — N40.1 BENIGN PROSTATIC HYPERPLASIA WITH URINARY FREQUENCY: Chronic | ICD-10-CM

## 2025-08-29 DIAGNOSIS — R35.0 BENIGN PROSTATIC HYPERPLASIA WITH URINARY FREQUENCY: Chronic | ICD-10-CM

## 2025-08-29 RX ORDER — FINASTERIDE 5 MG/1
5 TABLET, FILM COATED ORAL NIGHTLY
Qty: 90 TABLET | Refills: 0 | Status: SHIPPED | OUTPATIENT
Start: 2025-08-29

## 2025-08-29 RX ORDER — TAMSULOSIN HYDROCHLORIDE 0.4 MG/1
0.4 CAPSULE ORAL DAILY
Qty: 90 CAPSULE | Refills: 0 | Status: SHIPPED | OUTPATIENT
Start: 2025-08-29